# Patient Record
Sex: MALE | Race: WHITE | Employment: OTHER | URBAN - METROPOLITAN AREA
[De-identification: names, ages, dates, MRNs, and addresses within clinical notes are randomized per-mention and may not be internally consistent; named-entity substitution may affect disease eponyms.]

---

## 2018-06-11 PROBLEM — E66.01 OBESITY, MORBID (HCC): Status: ACTIVE | Noted: 2018-06-11

## 2019-04-09 PROBLEM — I48.3 TYPICAL ATRIAL FLUTTER (HCC): Status: ACTIVE | Noted: 2019-04-09

## 2019-05-12 ENCOUNTER — HOSPITAL ENCOUNTER (OUTPATIENT)
Dept: SLEEP MEDICINE | Age: 69
Discharge: HOME | End: 2019-05-12
Attending: INTERNAL MEDICINE

## 2019-05-14 NOTE — PROGRESS NOTES
Patient pre-assessment complete for BEBETO & Atrial flutter ablation with Dr Niko Thompson scheduled for 19 at 10:30am, arrival time 8:30am. Patient verified using . Patient instructed to bring all home medications in labeled bottles on the day of procedure. NPO status reinforced. Patient instructed to HOLD losartan on Wednesday night & HOLD eliquis the am of procedure. Instructed they can take all other medications excluding vitamins & supplements. Patient verbalizes understanding of all instructions & denies any questions at this time.

## 2019-05-15 ENCOUNTER — ANESTHESIA EVENT (OUTPATIENT)
Dept: SURGERY | Age: 69
End: 2019-05-15
Payer: MEDICARE

## 2019-05-15 RX ORDER — SODIUM CHLORIDE, SODIUM LACTATE, POTASSIUM CHLORIDE, CALCIUM CHLORIDE 600; 310; 30; 20 MG/100ML; MG/100ML; MG/100ML; MG/100ML
75 INJECTION, SOLUTION INTRAVENOUS CONTINUOUS
Status: CANCELLED | OUTPATIENT
Start: 2019-05-15

## 2019-05-15 RX ORDER — FLUMAZENIL 0.1 MG/ML
0.2 INJECTION INTRAVENOUS
Status: CANCELLED | OUTPATIENT
Start: 2019-05-15

## 2019-05-15 RX ORDER — NALOXONE HYDROCHLORIDE 0.4 MG/ML
0.1 INJECTION, SOLUTION INTRAMUSCULAR; INTRAVENOUS; SUBCUTANEOUS
Status: CANCELLED | OUTPATIENT
Start: 2019-05-15

## 2019-05-15 RX ORDER — MIDAZOLAM HYDROCHLORIDE 1 MG/ML
2 INJECTION, SOLUTION INTRAMUSCULAR; INTRAVENOUS
Status: CANCELLED | OUTPATIENT
Start: 2019-05-15 | End: 2019-05-16

## 2019-05-15 RX ORDER — SODIUM CHLORIDE, SODIUM LACTATE, POTASSIUM CHLORIDE, CALCIUM CHLORIDE 600; 310; 30; 20 MG/100ML; MG/100ML; MG/100ML; MG/100ML
100 INJECTION, SOLUTION INTRAVENOUS CONTINUOUS
Status: CANCELLED | OUTPATIENT
Start: 2019-05-15 | End: 2019-05-16

## 2019-05-15 RX ORDER — HYDROMORPHONE HYDROCHLORIDE 2 MG/ML
0.5 INJECTION, SOLUTION INTRAMUSCULAR; INTRAVENOUS; SUBCUTANEOUS
Status: CANCELLED | OUTPATIENT
Start: 2019-05-15

## 2019-05-15 RX ORDER — OXYCODONE HYDROCHLORIDE 5 MG/1
5 TABLET ORAL
Status: CANCELLED | OUTPATIENT
Start: 2019-05-15 | End: 2019-05-16

## 2019-05-15 RX ORDER — DIPHENHYDRAMINE HYDROCHLORIDE 50 MG/ML
12.5 INJECTION, SOLUTION INTRAMUSCULAR; INTRAVENOUS
Status: CANCELLED | OUTPATIENT
Start: 2019-05-15

## 2019-05-15 RX ORDER — LIDOCAINE HYDROCHLORIDE 10 MG/ML
0.1 INJECTION INFILTRATION; PERINEURAL AS NEEDED
Status: CANCELLED | OUTPATIENT
Start: 2019-05-15

## 2019-05-16 ENCOUNTER — ANESTHESIA (OUTPATIENT)
Dept: SURGERY | Age: 69
End: 2019-05-16
Payer: MEDICARE

## 2019-05-16 ENCOUNTER — APPOINTMENT (OUTPATIENT)
Dept: CARDIAC CATH/INVASIVE PROCEDURES | Age: 69
End: 2019-05-16
Attending: INTERNAL MEDICINE
Payer: MEDICARE

## 2019-05-16 ENCOUNTER — HOSPITAL ENCOUNTER (OUTPATIENT)
Age: 69
Setting detail: OUTPATIENT SURGERY
Discharge: HOME | End: 2019-05-16
Attending: INTERNAL MEDICINE | Admitting: INTERNAL MEDICINE
Payer: MEDICARE

## 2019-05-16 ENCOUNTER — APPOINTMENT (OUTPATIENT)
Dept: GENERAL RADIOLOGY | Age: 69
End: 2019-05-16
Attending: INTERNAL MEDICINE
Payer: MEDICARE

## 2019-05-16 VITALS
TEMPERATURE: 98.1 F | DIASTOLIC BLOOD PRESSURE: 63 MMHG | SYSTOLIC BLOOD PRESSURE: 148 MMHG | BODY MASS INDEX: 42.95 KG/M2 | WEIGHT: 300 LBS | HEART RATE: 82 BPM | OXYGEN SATURATION: 98 % | RESPIRATION RATE: 15 BRPM | HEIGHT: 70 IN

## 2019-05-16 LAB
ANION GAP SERPL CALC-SCNC: 5 MMOL/L (ref 7–16)
ATRIAL RATE: 266 BPM
ATRIAL RATE: 90 BPM
BUN SERPL-MCNC: 14 MG/DL (ref 8–23)
CALCIUM SERPL-MCNC: 9.1 MG/DL (ref 8.3–10.4)
CALCULATED P AXIS, ECG09: -104 DEGREES
CALCULATED P AXIS, ECG09: 81 DEGREES
CALCULATED R AXIS, ECG10: -36 DEGREES
CALCULATED R AXIS, ECG10: -62 DEGREES
CALCULATED T AXIS, ECG11: 32 DEGREES
CALCULATED T AXIS, ECG11: 63 DEGREES
CHLORIDE SERPL-SCNC: 110 MMOL/L (ref 98–107)
CO2 SERPL-SCNC: 28 MMOL/L (ref 21–32)
CREAT SERPL-MCNC: 0.97 MG/DL (ref 0.8–1.5)
DIAGNOSIS, 93000: NORMAL
DIAGNOSIS, 93000: NORMAL
ERYTHROCYTE [DISTWIDTH] IN BLOOD BY AUTOMATED COUNT: 12.4 % (ref 11.9–14.6)
GLUCOSE SERPL-MCNC: 109 MG/DL (ref 65–100)
HCT VFR BLD AUTO: 43.1 % (ref 41.1–50.3)
HGB BLD-MCNC: 14 G/DL (ref 13.6–17.2)
INR PPP: 1
MAGNESIUM SERPL-MCNC: 2.2 MG/DL (ref 1.8–2.4)
MCH RBC QN AUTO: 29.9 PG (ref 26.1–32.9)
MCHC RBC AUTO-ENTMCNC: 32.5 G/DL (ref 31.4–35)
MCV RBC AUTO: 91.9 FL (ref 79.6–97.8)
NRBC # BLD: 0 K/UL (ref 0–0.2)
P-R INTERVAL, ECG05: 188 MS
PLATELET # BLD AUTO: 193 K/UL (ref 150–450)
PMV BLD AUTO: 9.9 FL (ref 9.4–12.3)
POTASSIUM SERPL-SCNC: 4 MMOL/L (ref 3.5–5.1)
PROTHROMBIN TIME: 13 SEC (ref 11.7–14.5)
Q-T INTERVAL, ECG07: 300 MS
Q-T INTERVAL, ECG07: 366 MS
QRS DURATION, ECG06: 76 MS
QRS DURATION, ECG06: 78 MS
QTC CALCULATION (BEZET), ECG08: 446 MS
QTC CALCULATION (BEZET), ECG08: 447 MS
RBC # BLD AUTO: 4.69 M/UL (ref 4.23–5.6)
SODIUM SERPL-SCNC: 143 MMOL/L (ref 136–145)
VENTRICULAR RATE, ECG03: 133 BPM
VENTRICULAR RATE, ECG03: 90 BPM
WBC # BLD AUTO: 6.8 K/UL (ref 4.3–11.1)

## 2019-05-16 PROCEDURE — 93653 COMPRE EP EVAL TX SVT: CPT

## 2019-05-16 PROCEDURE — 74011250636 HC RX REV CODE- 250/636

## 2019-05-16 PROCEDURE — C1894 INTRO/SHEATH, NON-LASER: HCPCS

## 2019-05-16 PROCEDURE — 77030013687 HC GD NDL BARD -B

## 2019-05-16 PROCEDURE — C1732 CATH, EP, DIAG/ABL, 3D/VECT: HCPCS

## 2019-05-16 PROCEDURE — 93613 INTRACARDIAC EPHYS 3D MAPG: CPT

## 2019-05-16 PROCEDURE — 85610 PROTHROMBIN TIME: CPT

## 2019-05-16 PROCEDURE — 74011250636 HC RX REV CODE- 250/636: Performed by: INTERNAL MEDICINE

## 2019-05-16 PROCEDURE — 77030035291 HC TBNG PMP SMARTABLATE J&J -B

## 2019-05-16 PROCEDURE — 77030027107 HC PTCH EXT REF CARTO3 J&J -F

## 2019-05-16 PROCEDURE — 85027 COMPLETE CBC AUTOMATED: CPT

## 2019-05-16 PROCEDURE — 93005 ELECTROCARDIOGRAM TRACING: CPT | Performed by: INTERNAL MEDICINE

## 2019-05-16 PROCEDURE — 76060000034 HC ANESTHESIA 1.5 TO 2 HR: Performed by: INTERNAL MEDICINE

## 2019-05-16 PROCEDURE — 83735 ASSAY OF MAGNESIUM: CPT

## 2019-05-16 PROCEDURE — 71045 X-RAY EXAM CHEST 1 VIEW: CPT

## 2019-05-16 PROCEDURE — 93312 ECHO TRANSESOPHAGEAL: CPT

## 2019-05-16 PROCEDURE — 93621 COMP EP EVL L PAC&REC C SINS: CPT

## 2019-05-16 PROCEDURE — 80048 BASIC METABOLIC PNL TOTAL CA: CPT

## 2019-05-16 PROCEDURE — C1893 INTRO/SHEATH, FIXED,NON-PEEL: HCPCS

## 2019-05-16 RX ORDER — HYDROCODONE BITARTRATE AND ACETAMINOPHEN 5; 325 MG/1; MG/1
1 TABLET ORAL
Status: DISCONTINUED | OUTPATIENT
Start: 2019-05-16 | End: 2019-05-16 | Stop reason: HOSPADM

## 2019-05-16 RX ORDER — SODIUM CHLORIDE 0.9 % (FLUSH) 0.9 %
5-40 SYRINGE (ML) INJECTION AS NEEDED
Status: DISCONTINUED | OUTPATIENT
Start: 2019-05-16 | End: 2019-05-16 | Stop reason: HOSPADM

## 2019-05-16 RX ORDER — SODIUM CHLORIDE, SODIUM LACTATE, POTASSIUM CHLORIDE, CALCIUM CHLORIDE 600; 310; 30; 20 MG/100ML; MG/100ML; MG/100ML; MG/100ML
INJECTION, SOLUTION INTRAVENOUS
Status: DISCONTINUED | OUTPATIENT
Start: 2019-05-16 | End: 2019-05-16 | Stop reason: HOSPADM

## 2019-05-16 RX ORDER — PROPOFOL 10 MG/ML
INJECTION, EMULSION INTRAVENOUS
Status: DISCONTINUED | OUTPATIENT
Start: 2019-05-16 | End: 2019-05-16 | Stop reason: HOSPADM

## 2019-05-16 RX ORDER — PROPOFOL 10 MG/ML
INJECTION, EMULSION INTRAVENOUS AS NEEDED
Status: DISCONTINUED | OUTPATIENT
Start: 2019-05-16 | End: 2019-05-16 | Stop reason: HOSPADM

## 2019-05-16 RX ORDER — ONDANSETRON 2 MG/ML
INJECTION INTRAMUSCULAR; INTRAVENOUS AS NEEDED
Status: DISCONTINUED | OUTPATIENT
Start: 2019-05-16 | End: 2019-05-16 | Stop reason: HOSPADM

## 2019-05-16 RX ORDER — ACETAMINOPHEN 325 MG/1
650 TABLET ORAL
Status: DISCONTINUED | OUTPATIENT
Start: 2019-05-16 | End: 2019-05-16 | Stop reason: HOSPADM

## 2019-05-16 RX ORDER — HYDROMORPHONE HYDROCHLORIDE 1 MG/ML
1 INJECTION, SOLUTION INTRAMUSCULAR; INTRAVENOUS; SUBCUTANEOUS
Status: DISCONTINUED | OUTPATIENT
Start: 2019-05-16 | End: 2019-05-16 | Stop reason: HOSPADM

## 2019-05-16 RX ORDER — SODIUM CHLORIDE 0.9 % (FLUSH) 0.9 %
5 SYRINGE (ML) INJECTION AS NEEDED
Status: DISCONTINUED | OUTPATIENT
Start: 2019-05-16 | End: 2019-05-16 | Stop reason: HOSPADM

## 2019-05-16 RX ORDER — HEPARIN SODIUM 200 [USP'U]/100ML
20-40 INJECTION, SOLUTION INTRAVENOUS CONTINUOUS
Status: DISCONTINUED | OUTPATIENT
Start: 2019-05-16 | End: 2019-05-16 | Stop reason: HOSPADM

## 2019-05-16 RX ORDER — SODIUM CHLORIDE 0.9 % (FLUSH) 0.9 %
5-40 SYRINGE (ML) INJECTION EVERY 8 HOURS
Status: DISCONTINUED | OUTPATIENT
Start: 2019-05-16 | End: 2019-05-16 | Stop reason: HOSPADM

## 2019-05-16 RX ORDER — MIDAZOLAM HYDROCHLORIDE 1 MG/ML
INJECTION, SOLUTION INTRAMUSCULAR; INTRAVENOUS AS NEEDED
Status: DISCONTINUED | OUTPATIENT
Start: 2019-05-16 | End: 2019-05-16 | Stop reason: HOSPADM

## 2019-05-16 RX ADMIN — HEPARIN SODIUM 20 UNITS/HR: 200 INJECTION, SOLUTION INTRAVENOUS at 12:31

## 2019-05-16 RX ADMIN — SODIUM CHLORIDE, SODIUM LACTATE, POTASSIUM CHLORIDE, CALCIUM CHLORIDE: 600; 310; 30; 20 INJECTION, SOLUTION INTRAVENOUS at 11:32

## 2019-05-16 RX ADMIN — MIDAZOLAM HYDROCHLORIDE 1 MG: 1 INJECTION, SOLUTION INTRAMUSCULAR; INTRAVENOUS at 11:45

## 2019-05-16 RX ADMIN — HEPARIN SODIUM 3000 UNITS: 1000 INJECTION, SOLUTION INTRAVENOUS; SUBCUTANEOUS at 12:16

## 2019-05-16 RX ADMIN — HEPARIN SODIUM 20 UNITS/HR: 200 INJECTION, SOLUTION INTRAVENOUS at 12:32

## 2019-05-16 RX ADMIN — PROPOFOL 20 MG: 10 INJECTION, EMULSION INTRAVENOUS at 11:47

## 2019-05-16 RX ADMIN — PROPOFOL 160 MCG/KG/MIN: 10 INJECTION, EMULSION INTRAVENOUS at 11:42

## 2019-05-16 RX ADMIN — MIDAZOLAM HYDROCHLORIDE 1 MG: 1 INJECTION, SOLUTION INTRAMUSCULAR; INTRAVENOUS at 11:42

## 2019-05-16 NOTE — PROCEDURES
Attending: Shraddha Graham. Willem Tiwari MD    Referring: Rony Orlando DO and Azeem Bethea MD      Pre-Electrophysiology Diagnosis  1. Typical atrial flutter.     Procedure Performed  1. Electrophysiology testing with right-sided atrial flutter ablation. 2. Left atrial pacing recording from the coronary sinus. 3. 3-D Electroanatomical mapping  4. Transesophageal echo    Anesthesia: MAC     Estimated Blood Loss: Less than 10 mL     Specimens: * No specimens in log *    Fluoroscopy Time: 0.6 minutes/20 mGy. Complications: None      Procedure in Detail:  The patient was brought to the electrophysiology lab in the fasting state. A Ref-Star CARTO patch was placed, the patient was then prepped and draped in sterile fashion. A transesophageal echocardiogram was performed prior to the procedure and was negative for an CATRINA thrombus (see full report in the chart). Venous access was then obtained x2 using modified Seldinger technique under ultrasound guidance, with placement of 2 short sidearm sheaths into the right femoral vein. One of the sheaths was upsized to an 8.5 F SL-1 sheath. A 3.5 mm [employer] porous irrigated [employer] ablation catheter was inserted into the SL-1 sheath and was used to create a 3D electroanatomical map of the right atrium focusing on the cavotricuspid isthmus and into the coronary sinus. The ablation catheter was used to record intracardiac electrograms along the lateral wall of the right atrium and the His electrogram.  A multipolar catheter was then inserted via an 8Fr sheath and positioned in the mid coronary sinus. The patient presented to the EP lab in the clinical arrhythmia with pre-procedural concern for a right atrial flutter. After right atrial and coronary sinus electrograms were obtained, it was clear the majority of the cycle length was accounted for with a counterclockwise activation pattern consistent with CTI dependent atrial flutter.   Entrainment was performed with proximal CS pacing and lateral TV pacing revealing concealed entrainment with a PPI-TCL of less than 20 msec. RF ablation was performed during the clinical tachycardia. Linear ablation across the cavo-tricuspid isthmus was performed starting with 1:2 A:V EGMs along the isthmus at 6pm ILEANA. During delivery of RF, the arrhythmia terminated and further ablation was performed to obtain bidirectional block. The local electrogram activation sequence, differential pacing maneuvers and electrogram timing was used to demonstrate bidirectional block along the cavotricuspid isthmus with further ablation. Tachycardia cycle length: 240 msec  Local double potential atrial electrograms: 90 msec  Trans-isthmus time post ablation: 152 msec    The coronary sinus multipolar catheter was used to pace the left atrium during the EP study. The LA CS electrograms were documented and interpreted during the procedure. A comprehensive EP study was performed with 1:1 AV decremental pacing, atrial extrastimuli and ventricular pacing to assess retrograde conduction. The patient did not have sustained slow pathway conduction or evidence of an accessory pathway. Ventricular pacing revealed retrograde VA conduction which was concentric and decremental.    Baseline Intervals    QRS duration: 73 msec  MA interval: 181 msec  RR interval: 478 msec  AH interval: 107 msec  HV interval: 52 msec    EP Study    AV Wenchebach: 300 msec  VA Wenchebach: <500 msec    Figure 1: 3D electroanatomic map of the right atrium. Ablation tags consistent with successful line of ablation across the cavo-tricuspid isthmus. At the completion of the final comprehensive EP study, all catheters were removed, and sheaths pulled. The patient tolerated the procedure well with no acute complications recognized. Plan of care:  The patient will be placed in observation on telemetry, 4 hour flat time, followed by ambulation as tolerated and will continue anticoagulation as prescribed pre-procedure. Summary:   1. Successful ablation of clockwise RA flutter. 2. Creation of a line of bidirectional block at the cavotricuspid isthmus. 3.         Comprehensive EP study . 4. Pt tolerated the procedure well. 5. Family updated. Plan:  -Discharge after successful 4 hour bedrest.   -Continue current medicines. -EP follow up in one month. Alyssa Born.  Pham Ivy MD, [facility] [address]  Clinical Cardiac Electrophysiology  [facility] Cardiology  5/16/2019  1:26 PM

## 2019-05-16 NOTE — PROGRESS NOTES
Assisted to ambulate in hallway. Tolerated well. Right groin site remained soft with no bleeding to dsg noted. Discharge instructions given. All questions answered.

## 2019-05-16 NOTE — H&P
The patient has been examined and the previous clinic note dated, 2019, has been reviewed and changes have been noted below. 70year old male with a history of typical appearing atrial flutter here for AF ablation with pre-procedural BEBETO. He has undergone informed consent and will proceed with planned procedure under MAC. Erum Eldridge. Kalani Tao MD, MS Clinical Cardiac Electrophysiology [facility] Cardiology NAME:  Christopher Pelayo : 1950 MRN: [id number]  
  
Referring: Yunior Pina DO and Sharda Ruth MD 
  
Reason for Consultation: Atrial flutter  
  
ASSESSMENT and PLAN: 
Diagnoses and all orders for this visit: 
  
1. Typical atrial flutter ([address] [id number] = 3+ 
  
2. Essential hypertension 
  
3. Hypothyroidism due to acquired atrophy of thyroid 
  
4. Pre-diabetes 
  
5. Mixed hyperlipidemia 
  
6. Obesity  
  
7. Snoring 
  
8. Daytime sleepiness  
  
70-year-old male with a recent diagnosis of typical appearing atrial flutter by his primary care physician. The patient has some mild symptoms of fatigue but otherwise denies anything drastic such as shortness of breath or chest pain. We discussed that atrial flutter can be very difficult to control with medicines alone and it does increase his stroke risk. We also discussed that it has a high probability of affecting his heart function by causing a tachycardia induced cardiomyopathy. We discussed the treatment options which include doing nothing further versus consideration of a cardioversion with an antiarrhythmic drug versus a catheter ablation procedure. We discussed that he would have to be on medicines for the long-term but if he chose to do the ablation we could potentially get him off and only his beta-blocker but his blood thinner within several months of doing the procedure and offer a cure for his atrial flutter potentially.   He was motivated to undergo the ablation procedure and we will set him up in the near future to perform this along with a preprocedure BEBETO. 
  
He also is obese and has a large neck and snores at night. He does exhibit symptoms of daytime sleepiness. We will refer him for sleep study and have them further referred for treatment for his sleep apnea if he is diagnosed with this as it can reduce his potential of having further atrial arrhythmias including atrial fibrillation in the future. 
  
-Plan for AFL ablation. -BEBETO 
-Echo 
-Referral for sleep study. 
-Continue current therapyies. -Lifestyle modification/weight loss  
  
     I  explained  to  the  patient  in  detail  today  the  pathophysiology  of  and  managementoptions  for  atrial  flutter,  including  rate  control  versus  rhythm  control  with  antiarrhythmic  drugs  (AAD)  or  catheter  ablation. I  also  explained  to  the  patient  that  approximately  50%  of  patients  with  atrial  flutter  may  have  or  develop  atrial  fibrillation  some  time  in  the  future. This  patient  has  not  had  documented  atrial  fibrillation  and  a  typical  atrial  flutter  ablation  is  not  technically  complex  and  relatively  low  risk  (<<1%  for  major complications). I  also  explained  that  atrial  flutter  is  often  more  difficult  to  rate  control  than  atrial  fibrillation,  and  although  this  will  not  address  AF  if  it  recurs  (nor  left  atrial  flutters),  a  typical  (right)  atrial  flutter  ablation  can  almost  always  eliminate  episodes  of  persistent  AFL  with  RVR. 
  
    This  may  provide  for  improved  rate  control  and  symptomatic  relief  in  the  future. Then,  if  AF  recurs  and  is  symptomatic,  we  can  discuss  further  rhythm  control  strategies  at  that  time.     The  catheter  ablation  procedure  was  described  to the  patient  in  detail,  including  the  risks  of  recurrent  AF,  stroke,  cardiac perforation  with  the  need  for  catheter  drainage  or  surgical  repair,  vascular  damage,  DVT/PE,  bleeding,  pneumo/hemothorax,  need  for  permanent  pacemaker,  phrenic  or  vagus  nerve  damage  resulting  in  diaphragmatic  paralysis  or  gastroparesis,  thermal  skin  burns,  and  even  death. The  patient  understands  these  risks  and  wishes  to  proceed  with  ablation. We  will  perform  the  ablation  procedure  on  oral  anticoagulation  therapy  ([facility])  to  reduce  the  risk  of  developing  LA  thrombus. The  patient  understands  that  there  will  be  a  slightly  higher  bleeding  risk  and  agrees  to  proceed  as  planned. The  patient  will  need  to  continue  anticoagulation  for  at  least  one  month  post  procedure,  and  should  continue  until  we  can  satisfactorily  document  the  lack  of  recurrent  atrial  arrhythmias.   
  
I spent  a  total  of  45  minutes  with  the  patient,  with  over  half  of  the  time  spent  discussing  pathophysiolgy  and  treatment/management options. 
  
Patient has been instructed and agrees to call our office with any issues or other concerns related to their cardiac condition(s) and/or complaint(s). Thank you for allowing me to participate in the electrophysiologic care of Mr. Marlee Partida. Please contact me if any questions or concerns were to arise. 
  
Jace Randle MD, MS Clinical Cardiac Electrophysiology [facility] Cardiology 04/12/19 
9:13 AM 
  
=================================================================== Chief Complant:   
Chief Complaint Patient presents with  Referral / Consult  
    per Dr. Lucy Raygoza for afib   
  
  
Consultation is requested by Nahomy Russ for evaluation of Referral / Consult (per Dr. Lucy Raygoza for afib )   
  
History: 
Marlee Partida is a most pleasant 70 y.o. male with a past medical and cardiac history significant for recently he is lived in the [place] area for several years and is  to his wife. Atrial flutter by his primary care doctor, Dr. Aminata Gan. He was referred to Dr. Sloan Vee earlier this week for atrial flutter. At that time he was started on Eliquis and metoprolol. Given his minimal symptoms he was set up for referral to electrophysiology. The patient describes having no significant symptoms such as shortness of breath or chest pain. He does exhibit some mild fatigue. He did not realize he was out of rhythm and atrial flutter. He is obese with a BMI of 40. He does have a large neck and he has a history of snoring as well. Denies a significant past cardiac history. He further denies a history of diabetes type 2 or arterial disease. He has never undergone a left heart catheterization and is not have a recent stress test in our system. He works as a  and was born in [place], [place] 
  
Cardiac PMH: (Old records have been reviewed and summarized below) 
  
EKG:  (EKG has been independently visualized by me with interpretation below): Atrial flutter with rapid ventricular response.  
  
ECHO: n/a  
  
Previous Heart Catheterization: n/a  
  
Stress Test: n/a  
  
Past Medical History, Past Surgical History, Family history, Social History, and Medications were all reviewed with the patient today and updated as necessary.  
  
      
Current Outpatient Medications Medication Sig Dispense Refill  levothyroxine (SYNTHROID) 200 mcg tablet Take 1 Tab by mouth Daily (before breakfast). 90 Tab 3  
 losartan (COZAAR) 50 mg tablet Take 1 Tab by mouth daily. (Patient taking differently: Take 25 mg by mouth daily.) 90 Tab 3  pravastatin (PRAVACHOL) 20 mg tablet Take 1 Tab by mouth nightly. 90 Tab 3  
 allopurinol (ZYLOPRIM) 300 mg tablet TAKE 1 TABLET BY MOUTH EVERY DAY 30 Tab 0  
 metoprolol succinate (TOPROL-XL) 25 mg XL tablet Take 1 Tab by mouth daily.  30 Tab 11  
  apixaban (ELIQUIS) 5 mg tablet Take 1 Tab by mouth two (2) times a day. 60 Tab 6  
  
     
Allergies Allergen Reactions  Other Plant, Animal, Environmental Other (comments)  
    Wasp. Dizziness; syncopal  
 Bee Sting [Sting, Bee] Anaphylaxis  
  
    
Patient Active Problem List  
  Diagnosis  Typical atrial flutter ([address]  Obesity, morbid ([address]  Mixed hyperlipidemia  Idiopathic gout  Pre-diabetes  Essential hypertension  Hypothyroidism due to acquired atrophy of thyroid  Overweight(278.02)  Elevated PSA  
    The patient presented with a PSA of 6.21 in July 2012. Prostate biopsy was negative in October 2012. Prostate volume was estimated at 30 cubic centimeters. 
  
   
 Hematuria  
    Pre op 
  
   
 Osteoarthritis of hip  JOSUÉ (total hip arthroplasty)  Osteoarthritis of hip  JOSUÉ (total hip arthroplasty)  
  
  
    
Past Medical History:  
Diagnosis Date  Arrhythmia 2006  
  LAST ECHO 2006 ? [facility]  Elevated PSA    
  Dr Eboni Berry  Gout    
  medication  Hematuria 8/4/2011  
  Pre op  History of hepatitis B 1980  Hypertension    
  controlled with medication [address]  Mixed hyperlipidemia    
  ascvd risk 20.5 in 2016  Morbid obesity ([address]    
 Osteoarthritis of hip 4/6/2011  
  LEFT TKA  Pre-diabetes    
 JOSUÉ (total hip arthroplasty) 8/3/2011  Thyroid disease    
  hypothyroidism - medication  
  
     
Past Surgical History:  
Procedure Laterality Date  HX HEENT      
  lens implants  HX ORTHOPAEDIC   1965  
  left middle finger repair bone chip [facility]  MA PROSTATE BIOPSY, NEEDLE, SATURATION SAMPLING      
 TOTAL HIP ARTHROPLASTY   4/2011  
  LEFT and right  
  
     
Family History Problem Relation Age of Onset  Lung Disease Mother    
      CHF  Lung Disease Father    
 Cancer Father    
      liver cancer  Other Sister    
 Malignant Hyperthermia Neg Hx    
  Delayed Awakening Neg Hx    
 Post-op Nausea/Vomiting Neg Hx    
 Emergence Delirium Neg Hx    
 Post-op Cognitive Dysfunction Neg Hx    
 Pseudocholinesterase Deficiency Neg Hx    
  
Social History  
  
     
Tobacco Use  Smoking status: Never Smoker  Smokeless tobacco: Never Used Substance Use Topics  Alcohol use: No  
    Comment: few times a year  
  
  
ROS:  A comprehensive review of systems was performed with the pertinent positives and negatives as noted in the HPI in addition to: 
  
Review of Systems Constitutional: Negative. HENT: Negative. Eyes: Negative. Respiratory: Negative. Cardiovascular: Negative. Gastrointestinal: Negative. Genitourinary: Negative. Musculoskeletal: Negative. Skin: Negative. Neurological: Negative. Endo/Heme/Allergies: Negative. Psychiatric/Behavioral: Negative.   
  
PHYSICAL EXAM: 
  
Visit Vitals /90 Ht 5' 11\" (1.803 m) Wt 315 lb (142.9 kg) BMI 43.93 kg/m²  
  
  
   
Wt Readings from Last 3 Encounters:  
04/12/19 315 lb (142.9 kg) 04/09/19 318 lb (144.2 kg) 04/09/19 318 lb 3.2 oz (144.3 kg)  
  
   
BP Readings from Last 3 Encounters:  
04/12/19 140/90  
04/09/19 148/90  
04/09/19 140/72  
  
  
Gen: Well appearing, well developed, no acute distress, obese, large neck circumference. Eyes: Pupils equal, round. Extraocular movements are intact ENT: Oropharynx clear, no oral lesions, normal dentition CV: S1S2, IRRR, no murmurs, rubs or gallops, normal JVD, no carotid bruits, normal distal pulses, no RICARDO Pulm: Clear to auscultation bilaterally, no accessory muscle uses, no wheezes or rales GI: Soft, NT, ND, +BS Neuro: Alert and oriented, nonfocal 
Psych: Appropriate affect Skin: Normal color and skin turgor MSK: Normal muscle bulk and tone 
  
Medical problems and test results were reviewed with the patient today.  
  
No results found for any visits on 04/12/19. 
  
     
Lab Results Component Value Date/Time  
  Potassium 4.0 04/03/2019 09:53 AM  
  
     
Lab Results Component Value Date/Time  
  Creatinine 0.99 04/03/2019 09:53 AM  
  
     
Lab Results Component Value Date/Time  
  HGB 14.2 06/05/2018 08:57 AM  
  
     
Lab Results Component Value Date/Time  
  INR <0.9 (L) 07/27/2011 08:50 AM  
  INR 1.1 04/09/2011 05:14 AM  
  INR 1.0 04/08/2011 04:55 AM  
  Prothrombin time 9.4 07/27/2011 08:50 AM  
  Prothrombin time 11.4 04/09/2011 05:14 AM  
  Prothrombin time 10.6 04/08/2011 04:55 AM

## 2019-05-16 NOTE — ANESTHESIA PREPROCEDURE EVALUATION
Relevant Problems No relevant active problems Anesthetic History No history of anesthetic complications Review of Systems / Medical History Patient summary reviewed and pertinent labs reviewed Pulmonary Sleep apnea (Recently diagnosed. Has not started CPAP yet.) Neuro/Psych Within defined limits Cardiovascular Hypertension: well controlled Dysrhythmias : atrial flutter Exercise tolerance: >4 METS Comments: Denies recent CP, SOB or Palpitations GI/Hepatic/Renal 
Within defined limits Endo/Other Hypothyroidism: well controlled Morbid obesity Other Findings Physical Exam 
 
Airway Mallampati: III 
TM Distance: 4 - 6 cm Neck ROM: normal range of motion, short neck Mouth opening: Normal 
 
 Cardiovascular Rate: abnormal 
 
 
 
 Dental 
No notable dental hx Pulmonary Breath sounds clear to auscultation Abdominal 
GI exam deferred Other Findings Anesthetic Plan ASA: 3 Anesthesia type: total IV anesthesia Induction: Intravenous Anesthetic plan and risks discussed with: Patient

## 2019-05-16 NOTE — PROGRESS NOTES
Patient received to [address] [place] [address] room # 9  Ambulatory from [facility]. Patient scheduled for Aflutter today with Dr Dahlia Sage. Procedure reviewed & questions answered, voiced good understanding consent obtained & placed on chart. All medications and medical history reviewed. Will prep patient per orders. Patient & family updated on plan of care. The patient has a fraility score of 3-MANAGING WELL, based on ability to perform ADLS by self.

## 2019-05-16 NOTE — PROGRESS NOTES
Received from EP lab. Report from [place] [id number] for continued care. Drowsy on arrival. Instructed to keep right leg straight and to stay on back. Right groin remains soft with no bleeding noted. Dsg intact.

## 2019-05-16 NOTE — DISCHARGE INSTRUCTIONS
HEART CATHETERIZATION/ABLATION DISCHARGE INSTRUCTIONS    1. Check puncture site frequently for swelling or bleeding. If there is any bleeding, lie down and apply pressure over the area with a clean towel or washcloth. Notify your doctor for any redness, swelling, drainage, or oozing from the puncture site. Notify your doctor for any fever or chills. 2. If the extremity becomes cold, numb, or painful call [facility] Cardiology at [phone].  3. Activity should be limited for the next 48 hours. Climb stairs as little as possible and avoid any stooping, bending, or strenuous activity for 48 hours. No heavy lifting (anything over 10 pounds) for 3 days. 4. You may resume your usual diet. Drink more fluids than usual.  5. Have a responsible person drive you home and stay with you for at least 24 hours after your heart catheterization/angiography. 6. You may remove bandage from your Right groin in 24 hours. You may shower in 24 hours. No tub baths, hot tubs, or swimming for 1 week. Do not place any lotions, creams, powders, or ointments over puncture site for 1 week. You may place a clean band-aid over the puncture site each day for 5 days. Change daily. I have read the above instructions and have had the opportunity to ask questions.       Patient: ________________________   Date: 5/16/2019    Witness: _______________________   Date: 5/16/2019

## 2019-05-17 NOTE — ANESTHESIA POSTPROCEDURE EVALUATION
Procedure(s): 
BEBETO 
AFLUTTER ABLATION. total IV anesthesia Anesthesia Post Evaluation Patient location during evaluation: PACU Patient participation: complete - patient participated Level of consciousness: awake Pain management: adequate Airway patency: patent Anesthetic complications: no 
Cardiovascular status: acceptable Respiratory status: spontaneous ventilation and acceptable Hydration status: acceptable Post anesthesia nausea and vomiting:  none No vitals data found for the desired time range.

## 2019-05-20 RX ORDER — HEPARIN SODIUM 1000 [USP'U]/ML
INJECTION, SOLUTION INTRAVENOUS; SUBCUTANEOUS AS NEEDED
Status: DISCONTINUED | OUTPATIENT
Start: 2019-05-16 | End: 2019-05-20 | Stop reason: HOSPADM

## 2019-05-20 NOTE — ADDENDUM NOTE
Addendum  created 05/20/19 0363 by Robbie Lee CRNA Intraprocedure Meds edited, Orders acknowledged in Narrator

## 2019-05-24 PROBLEM — Z72.821 INADEQUATE SLEEP HYGIENE: Status: ACTIVE | Noted: 2019-05-24

## 2019-05-24 PROBLEM — G47.61 PLMD (PERIODIC LIMB MOVEMENT DISORDER): Status: ACTIVE | Noted: 2019-05-24

## 2019-05-24 PROBLEM — G47.34 NOCTURNAL HYPOXEMIA: Status: ACTIVE | Noted: 2019-05-24

## 2019-05-24 PROBLEM — G47.33 OSA (OBSTRUCTIVE SLEEP APNEA): Status: ACTIVE | Noted: 2019-05-24

## 2019-07-11 PROBLEM — R06.02 SHORTNESS OF BREATH: Status: ACTIVE | Noted: 2019-07-11

## 2019-08-07 PROBLEM — G47.30 SLEEP APNEA: Status: ACTIVE | Noted: 2019-05-14

## 2019-09-24 ENCOUNTER — HOSPITAL ENCOUNTER (OUTPATIENT)
Dept: GENERAL RADIOLOGY | Age: 69
Discharge: HOME | End: 2019-09-24
Attending: ORTHOPAEDIC SURGERY
Payer: MEDICARE

## 2019-09-24 VITALS
HEIGHT: 71 IN | OXYGEN SATURATION: 95 % | BODY MASS INDEX: 40.46 KG/M2 | DIASTOLIC BLOOD PRESSURE: 68 MMHG | HEART RATE: 90 BPM | SYSTOLIC BLOOD PRESSURE: 161 MMHG | WEIGHT: 289 LBS

## 2019-09-24 DIAGNOSIS — Z96.641 PRESENCE OF RIGHT ARTIFICIAL HIP JOINT: ICD-10-CM

## 2019-09-24 DIAGNOSIS — M25.551 PAIN IN RIGHT HIP: ICD-10-CM

## 2019-09-24 PROCEDURE — 74011000250 HC RX REV CODE- 250: Performed by: ORTHOPAEDIC SURGERY

## 2019-09-24 PROCEDURE — 20610 DRAIN/INJ JOINT/BURSA W/O US: CPT

## 2019-09-24 RX ORDER — LIDOCAINE HYDROCHLORIDE 20 MG/ML
10 INJECTION, SOLUTION INFILTRATION; PERINEURAL
Status: COMPLETED | OUTPATIENT
Start: 2019-09-24 | End: 2019-09-24

## 2019-09-24 RX ADMIN — LIDOCAINE HYDROCHLORIDE 10 ML: 20 INJECTION, SOLUTION INFILTRATION; PERINEURAL at 15:00

## 2019-09-30 ENCOUNTER — HOSPITAL ENCOUNTER (OUTPATIENT)
Dept: CT IMAGING | Age: 69
Discharge: HOME | End: 2019-09-30
Attending: OTOLARYNGOLOGY
Payer: MEDICARE

## 2019-09-30 DIAGNOSIS — J39.8: ICD-10-CM

## 2019-09-30 LAB — CREAT BLD-MCNC: 0.8 MG/DL (ref 0.8–1.5)

## 2019-09-30 PROCEDURE — 70491 CT SOFT TISSUE NECK W/DYE: CPT

## 2019-09-30 PROCEDURE — 74011000258 HC RX REV CODE- 258: Performed by: OTOLARYNGOLOGY

## 2019-09-30 PROCEDURE — 74011636320 HC RX REV CODE- 636/320: Performed by: OTOLARYNGOLOGY

## 2019-09-30 PROCEDURE — 82565 ASSAY OF CREATININE: CPT

## 2019-09-30 RX ORDER — SODIUM CHLORIDE 0.9 % (FLUSH) 0.9 %
10 SYRINGE (ML) INJECTION
Status: COMPLETED | OUTPATIENT
Start: 2019-09-30 | End: 2019-09-30

## 2019-09-30 RX ADMIN — SODIUM CHLORIDE 100 ML: 900 INJECTION, SOLUTION INTRAVENOUS at 07:51

## 2019-09-30 RX ADMIN — IOPAMIDOL 80 ML: 755 INJECTION, SOLUTION INTRAVENOUS at 07:50

## 2019-09-30 RX ADMIN — Medication 10 ML: at 07:50

## 2020-03-07 PROBLEM — M25.551 PAIN IN RIGHT HIP: Status: ACTIVE | Noted: 2020-03-07

## 2020-03-07 PROBLEM — Z96.641 PRESENCE OF RIGHT ARTIFICIAL HIP JOINT: Status: ACTIVE | Noted: 2020-03-07

## 2020-03-07 PROBLEM — Z96.642 PRESENCE OF LEFT ARTIFICIAL HIP JOINT: Status: ACTIVE | Noted: 2020-03-07

## 2020-03-07 PROBLEM — Z09 FOLLOW-UP EXAMINATION, FOLLOWING OTHER SURGERY: Status: ACTIVE | Noted: 2020-03-07

## 2020-03-29 PROBLEM — G47.30 SLEEP APNEA: Status: RESOLVED | Noted: 2019-05-14 | Resolved: 2020-03-29

## 2020-12-14 PROBLEM — R06.02 SHORTNESS OF BREATH: Status: RESOLVED | Noted: 2019-07-11 | Resolved: 2020-12-14

## 2020-12-14 PROBLEM — M25.551 PAIN IN RIGHT HIP: Status: RESOLVED | Noted: 2020-03-07 | Resolved: 2020-12-14

## 2020-12-14 PROBLEM — Z09 FOLLOW-UP EXAMINATION, FOLLOWING OTHER SURGERY: Status: RESOLVED | Noted: 2020-03-07 | Resolved: 2020-12-14

## 2022-03-18 PROBLEM — G47.61 PLMD (PERIODIC LIMB MOVEMENT DISORDER): Status: ACTIVE | Noted: 2019-05-24

## 2022-03-19 PROBLEM — I48.3 TYPICAL ATRIAL FLUTTER (HCC): Status: ACTIVE | Noted: 2019-04-09

## 2022-03-19 PROBLEM — G47.34 NOCTURNAL HYPOXEMIA: Status: ACTIVE | Noted: 2019-05-24

## 2022-03-19 PROBLEM — G47.33 OSA (OBSTRUCTIVE SLEEP APNEA): Status: ACTIVE | Noted: 2019-05-24

## 2022-04-25 PROBLEM — M79.18 GLUTEAL PAIN: Status: ACTIVE | Noted: 2022-04-25

## 2022-04-25 PROBLEM — R20.8 OTHER DISTURBANCES OF SKIN SENSATION: Status: ACTIVE | Noted: 2022-04-25

## 2022-05-26 ENCOUNTER — HOSPITAL ENCOUNTER (OUTPATIENT)
Dept: PHYSICAL THERAPY | Age: 72
Setting detail: RECURRING SERIES
Discharge: HOME | End: 2022-05-29
Payer: MEDICARE

## 2022-05-26 PROCEDURE — 97110 THERAPEUTIC EXERCISES: CPT

## 2022-05-26 PROCEDURE — 97162 PT EVAL MOD COMPLEX 30 MIN: CPT

## 2022-05-26 ASSESSMENT — PAIN SCALES - GENERAL: PAINLEVEL_OUTOF10: 1

## 2022-05-26 NOTE — PROGRESS NOTES
Zeeshan Soto  : 1950  Primary: Karin Carvajal Ppo  Secondary:  [address] [facility] Therapy [address] [address]  Phone: [phone]  Fax: [phone] Plan Frequency: 2x/wk for 12 weeks    Plan of Care/Certification Expiration Date: 22      PT Visit Info:    No data recorded    OUTPATIENT PHYSICAL THERAPY:OP NOTE TYPE: Treatment Note 2022       Episode   Appt Desk       Treatment Diagnosis:  Low Back Pain (M54.5)  Other intervertebral disc degeneration, lumbar region (M51.36)  Medical/Referring Diagnosis:  Other intervertebral disc degeneration, lumbar region [M51.36]  Referring Physician:  Sj Mcneil MD MD Orders:  PT Eval and Treat   Date of Onset:  Onset Date: 22     Allergies:  Patient has no allergy information on record. Restrictions/Precautions:    No data recordedNo data recorded   Interventions Planned (Treatment may consist of any combination of the following):    Current Treatment Recommendations: Strengthening; ROM; Manual Therapy - Soft Tissue Mobilization; Manual Therapy - Joint Manipulation; Pain management; Home exercise program; Patient/Caregiver education & training; Modalities; Aquatics     Subjective Comments:  Pt reports pain in his buttocks. Initial:     1/10 Post Session:     1/10  Medications Last Reviewed:  2022  Updated Objective Findings:  See evaluation note from today  Treatment   THERAPEUTIC EXERCISE: (10 minutes):    Exercises per grid below to improve mobility and strength. Required minimal verbal cues to promote proper body alignment and promote proper body mechanics. Progressed resistance and repetitions as indicated.      Date:  22 Date:   Date:     Activity/Exercise Parameters Parameters Parameters   SKTC 3 reps  15 sec holds  (HEP)     Pelvic Tilts 10 reps  5 sec holds  (HEP)     Theraband Clam Shells in Mc[a]list gamesson Green T-band  15 reps Treatment/Session Summary:    · Treatment Assessment:   Pt tolerated treatments well today. I encouraged him to carry wallet in his front pocket and to take breaks from sitting at work. · Communication/Consultation:  None today  · Equipment provided today:  None  · Recommendations/Intent for next treatment session: Next visit will focus on progression of therex as tolerable, manual therapy.     Total Treatment Billable Duration:  10 minutes  Time In: 0870  Time Out: 45 Kate Kovacs MD Guidelines  Scanned Media  Benefits  MyChart

## 2022-05-26 NOTE — PLAN OF CARE
Claudia Lyons  : 1950  Primary: Rey Marinelli Ppo  Secondary:  [facility] Therapy [address] [address]  Phone: [phone]  Fax: [phone] Plan Frequency: 2x/wk for 12 weeks    Plan of Care/Certification Expiration Date: 22      PT Visit Info:    No data recorded    OUTPATIENT PHYSICAL THERAPY:OP NOTE TYPE: Initial Assessment 2022               Episode  Appt Desk         Treatment Diagnosis:  Low Back Pain (M54.5)  Other intervertebral disc degeneration, lumbar region (M51.36)  * No diagnoses found *  Medical/Referring Diagnosis:  Other intervertebral disc degeneration, lumbar region [M51.36]  Referring Physician:  Edgar La MD MD Orders:  PT Eval and Treat   Return MD Appt:  2022  Date of Onset:  Onset Date: 22     Allergies:  Patient has no allergy information on record. Restrictions/Precautions:    No data recordedNo data recorded   Medications Last Reviewed:  2022     SUBJECTIVE   History of Injury/Illness (Reason for Referral):  Pt states 9 weeks ago he was sitting at work and when he stood up he had pain in his R buttocks. He states when he walked the pain gradually subsided. He states he saw MD recently and she wanted him to have an MRI but insurance is wanting him to go through 6 weeks of PT first.  He started using a rolling walker May 10 due to difficulties walking due to the R buttock pain. He had x-rays which revealed lumbar disc degeneration. He carries his wallet in his R back pocket. Pt rates his current R buttock pain 1/10 sitting at rest, increasing to 6/10 at worst over the past week. Pt works a seated job-sits all day at work and takes minimal breaks. Pt denies any radiating pain or numbness/tingling into his LE's. Pt is not taking any medications for his R buttock pain.   Pt states he started drinking barrel whiskey around the time that his pain started which he thinks may have contributed to his current symptoms. Patient Stated Goal(s): To get rid of pain  Initial:     1/10 Post Session:     1/10  Past Medical History/Comorbidities:   Mr. Rashad Sweet  has a past medical history of Arrhythmia, Elevated PSA, Gout, Hematuria, History of hepatitis A, Hypertension, Malaria, Mixed hyperlipidemia, Morbid obesity ([address] Osteoarthritis of hip, Pre-diabetes, Sleep apnea, and Thyroid disease. Mr. Rashad Sweet  has a past surgical history that includes Tonsillectomy (1956); prostate biopsy, needle, saturation sampling; heent; Colonoscopy (2012); orthopedic surgery (1965); ablation of dysrhythmic focus (2019); and total hip arthroplasty (4/2011). Social His tory/Living Environment:   Lives With: Spouse  Type of Home: House     Prior Level of Function/Work/Activity:   Prior level of function: Independent  Current level of function: Pt is currently having difficulties with household ADL's due to his buttock pain  No data recordedNo data recorded   Learning:   Does the patient/guardian have any barriers to learning?: No barriers  Will there be a co-learner?: No  What is the preferred language of the patient/guardian?: English  Is an  required?: No  How does the patient/guardian prefer to learn new concepts?: Demonstration; Pictures/Videos     Fall Risk Scale: Total Score: 15  Tsai Fall Risk: Low (0-24)     Dominant Side:  right handed    Personal Factors:        Sex:  male        Age:  66 y.o. Profession:  Pt works a desk job        OBJECTIVE   Observations:      Forward head, rounded shoulders    Ambulation/WC:     Pt walks with a rolling walker    Functional Mobility:    Pt able to transition sit to supine and supine to sit independently     Dermatomes:   Sensation intact to light touch B LE's    Myotomes:  Left LE Myotomes (Lower Quarter)  L4-5: Ankle Dorsiflexion: Weak  Right LE Myotomes (Lower Quarter)  L4-5: Ankle Dorsiflexion: Weak    Reflexes:   DTR's 1+ to bilateral patellar and achilles    Lumbar:   Palpation:  Tenderness bilateral piriformis and gluteals    ROM:  Lumbar Flexion = 30 degrees (increased spasms in bilateral buttocks)  Lumbar Extension = 10 degrees  Lumbar Side Bending R = 18 degrees  Lumbar Side Bending L = 18 degrees    Strength:  R hip flexion = 4+/5  R hip abduction = 4+/5  R hip adduction = 5/5  R knee extension = 5/5  R knee flexion = 5/5  R ankle dorsiflexion = 4-/5  R ankle plantarflexion = 5/5    L hip flexion = 4+/5  L hip abduction = 4+/5  L hip adduction = 5/5  L knee extension = 5/5  L knee flexion = 5/5  L ankle dorsiflexion = 4-/5  L ankle plantarflexion = 5/5    Special Tests:  SLR: 40 degrees with marked hamstring tightness noted bilaterally    ASSESSMENT   Initial Assessment:  Pt presents with decreased lumbar ROM, decreased LE strength/flexibility and increased pain leading to decreased functional status. Pt would benefit from skilled physical therapy services to address the above deficits and help patient return to prior level of function. Problem List: (Impacting functional limitations): Body Structures, Functions, Activity Limitations Requiring Skilled Therapeutic Intervention: Decreased functional mobility ; Decreased ROM; Decreased strength; Increased pain     Therapy Prognosis:   Therapy Prognosis: Good     Assessment Complexity:   Medium Complexity  PLAN   Effective Dates: 05-26-22 TO Plan of Care/Certification Expiration Date: 08/24/22     Frequency/Duration: Plan Frequency: 2x/wk for 12 weeks     Interventions Planned (Treatment may consist of any combination of the following):    Current Treatment Recommendations: Strengthening; ROM; Manual Therapy - Soft Tissue Mobilization; Manual Therapy - Joint Manipulation; Pain management; Home exercise program; Patient/Caregiver education & training; Modalities; Aquatics     Goals: (Goals have been discussed and agreed upon with patient.)  Short-Term Functional Goals: Time Frame: 4 weeks  1.  Pt will increase lumbar ROM flexion = 45 degrees to assist with daily activities  2. Pt will increase SLR 50 degrees bilaterally to assist with daily activities  3. Pt will be independent with HEP  Discharge Goals: Time Frame: 12 weeks  1. Pt will increase lumbar ROM flexion = 60 degrees to assist with daily activities  2. Pt will increase strength bilateral LE's 5/5 to assist with daily activities  3. Pt will perform 20 minutes household cleaning activities independently with min to no c/o back/buttock pain         Outcome Measure: Tool Used: Modified Oswestry Low Back Pain Questionnaire  Score:  Initial: 17/50  Most Recent: X/50 (Date: -- )   Interpretation of Score: Each section is scored on a 0-5 scale, 5 representing the greatest disability. The scores of each section are added together for a total score of 50. Medical Necessity:   Patient is expected to demonstrate progress in strength, range of motion and functional technique to increase independence with daily activities. Patient demonstrates good rehab potential due to higher previous functional level. Reason For Services/Other Comments:  Patient continues to require skilled intervention due to decreased lumbar ROM, decreased LE strength/flexibility and increased pain leading to decreased functional status. Total Duration:  Time In: 1345  Time Out: [address] therapy, I certify that the treatment plan above will be carried out by a therapist or under their direction.   Thank you for this referral,  Anne Monk PT     Referring Physician Signature: Bertha Lloyd MD _______________________________ Date _____________        Post Session Pain  Charge Capture   POC Link  Treatment Note Link  MD Guidelines  MyChart

## 2022-06-06 ENCOUNTER — HOSPITAL ENCOUNTER (OUTPATIENT)
Dept: PHYSICAL THERAPY | Age: 72
Setting detail: RECURRING SERIES
Discharge: HOME | End: 2022-06-09
Payer: MEDICARE

## 2022-06-06 PROCEDURE — 97110 THERAPEUTIC EXERCISES: CPT

## 2022-06-06 ASSESSMENT — PAIN SCALES - GENERAL: PAINLEVEL_OUTOF10: 4

## 2022-06-06 NOTE — PROGRESS NOTES
Nestor Dunbar  : 1950  Primary: Medicare Part A And B  Secondary:  Nico Johansen[address] [address]  Phone: [phone]  Fax: [phone] Plan Frequency: 2x/wk for 12 weeks    Plan of Care/Certification Expiration Date: 22      PT Visit Info:    No data recorded    OUTPATIENT PHYSICAL THERAPY:OP NOTE TYPE: Treatment Note 2022       Episode   Appt Desk       Treatment Diagnosis:  Low Back Pain (M54.5)  Other intervertebral disc degeneration, lumbar region (M51.36)  Medical/Referring Diagnosis:  Other intervertebral disc degeneration, lumbar region [M51.36]  Referring Physician:  Kathy Fonseca MD MD Orders:  PT Eval and Treat   Date of Onset:  Onset Date: 22     Allergies:  Patient has no allergy information on record. Restrictions/Precautions:    No data recordedNo data recorded   Interventions Planned (Treatment may consist of any combination of the following):    Current Treatment Recommendations: Strengthening; ROM; Manual Therapy - Soft Tissue Mobilization; Manual Therapy - Joint Manipulation; Pain management; Home exercise program; Patient/Caregiver education & training; Modalities; Aquatics     Subjective Comments:  \"I think therapy is going to help. \"  Initial:     4/10 Post Session:     4/10  Medications Last Reviewed:  2022  Updated Objective Findings:  See evaluation note from today  Treatment   THERAPEUTIC EXERCISE: (40 minutes):    Exercises per grid below to improve mobility and strength. Required minimal verbal cues to promote proper body alignment and promote proper body mechanics. Progressed resistance and repetitions as indicated.      Date:  22 Date:  22 Date:     Activity/Exercise Parameters Parameters Parameters   SKTC 3 reps  15 sec holds  (HEP) With towel for assist  3 reps  15 sec holds  B LE's    Pelvic Tilts 10 reps  5 sec holds  (HEP) 15 reps  5 sec holds    Theraband Clam Shells in [employer] Green T-band  15 reps Green T-band  20 reps    NuStep  Level 4  7 minutes    Supine Lumbar Rotation Stretch  10 reps  5 sec holds  Bilaterally    Supine Marching  15 reps  B LE's    Hip Adduction with Ball  15 reps  5 sec holds    Gluteal Sets  10 reps  5 sec holds          Treatment/Session Summary:    · Treatment Assessment:   Pt tolerated all treatments well today with mild c/o fatigue. Added Supine Marching and Gluteal Sets to HEP. · Communication/Consultation:  None today  · Equipment provided today:  None  · Recommendations/Intent for next treatment session: Next visit will focus on progression of therex as tolerable, manual therapy.     Total Treatment Billable Duration:  40 minutes  Time In: 4146  Time Out: [address], PT       Post Session Pain  Charge Capture  [employer] Portal  MD Guidelines  Scanned Media  Benefits  MyChart

## 2022-06-13 ENCOUNTER — HOSPITAL ENCOUNTER (OUTPATIENT)
Dept: PHYSICAL THERAPY | Age: 72
Setting detail: RECURRING SERIES
Discharge: HOME | End: 2022-06-16
Payer: MEDICARE

## 2022-06-13 PROCEDURE — 97110 THERAPEUTIC EXERCISES: CPT

## 2022-06-13 ASSESSMENT — PAIN SCALES - GENERAL: PAINLEVEL_OUTOF10: 0

## 2022-06-13 NOTE — PROGRESS NOTES
Lety Pabon  : 1950  Primary: Corwin Puentes Ppo  Secondary:  Havery Folds  [address] [facility] [address]  Phone: [phone]  Fax: [phone] Plan Frequency: 2x/wk for 12 weeks    Plan of Care/Certification Expiration Date: 22      PT Visit Info: Total # of Visits to Date: 3  Progress Note Counter: 3      OUTPATIENT PHYSICAL THERAPY:OP NOTE TYPE: Treatment Note 2022       Episode   Appt Desk       Treatment Diagnosis:  Low Back Pain (M54.5)  Other intervertebral disc degeneration, lumbar region (M51.36)  Medical/Referring Diagnosis:  Other intervertebral disc degeneration, lumbar region [M51.36]  Referring Physician:  Elmer Rubio MD MD Orders:  PT Eval and Treat   Date of Onset:  Onset Date: 22     Allergies:  Patient has no allergy information on record. Restrictions/Precautions:    No data recordedNo data recorded   Interventions Planned (Treatment may consist of any combination of the following):    Current Treatment Recommendations: Strengthening; ROM; Manual Therapy - Soft Tissue Mobilization; Manual Therapy - Joint Manipulation; Pain management; Home exercise program; Patient/Caregiver education & training; Modalities; Aquatics     Subjective Comments:  Pt states he is having no pain today. His main c/o today is weakness and decreased endurance. Initial:     0/10 Post Session:     0/10  Medications Last Reviewed:  2022  Updated Objective Findings:  None Today  Treatment   THERAPEUTIC EXERCISE: (40 minutes):    Exercises per grid below to improve mobility and strength. Required minimal verbal cues to promote proper body alignment and promote proper body mechanics. Progressed resistance and repetitions as indicated.      Date:  22 Date:  22 Date:  22   Activity/Exercise Parameters Parameters Parameters   SKTC 3 reps  15 sec holds  (HEP) With towel for assist  3 reps  15 sec holds  B Jose A With towel for assist  3 reps  15 sec holds  B LE's   Pelvic Tilts 10 reps  5 sec holds  (HEP) 15 reps  5 sec holds 15 reps  5 sec holds   Theraband Clam Shells in [employer] Green T-band  15 reps Green T-band  20 reps Blue T-band  20 reps   NuStep  Level 4  7 minutes Level 4  8 minutes   Supine Lumbar Rotation Stretch  10 reps  5 sec holds  Bilaterally 10 reps  5 sec holds  Bilaterally   Supine Marching  15 reps  B LE's 15 reps  B LE's   Hip Adduction with Ball  15 reps  5 sec holds 15 reps  5 sec holds   Gluteal Sets  10 reps  5 sec holds 15 reps  5 sec holds   Standing Marching, Hip Abduction and Hip Extension   15 reps each  B LE's         Treatment/Session Summary:    · Treatment Assessment:   Pt tolerated all treatments well today with no c/o. · Communication/Consultation:  None today  · Equipment provided today:  None  · Recommendations/Intent for next treatment session: Next visit will focus on progression of therex as tolerable, manual therapy.     Total Treatment Billable Duration:  40 minutes  Time In: 6842  Time Out: [address], PT       Post Session Pain  Charge Capture  [employer] Portal  MD Guidelines  Scanned Media  Benefits  MyChart

## 2022-06-20 ENCOUNTER — HOSPITAL ENCOUNTER (OUTPATIENT)
Dept: PHYSICAL THERAPY | Age: 72
Setting detail: RECURRING SERIES
Discharge: HOME | End: 2022-06-23
Payer: MEDICARE

## 2022-06-20 PROCEDURE — 97110 THERAPEUTIC EXERCISES: CPT

## 2022-06-20 ASSESSMENT — PAIN SCALES - GENERAL: PAINLEVEL_OUTOF10: 0

## 2022-06-20 NOTE — PROGRESS NOTES
Aparna Kc  : 1950  Primary: Medicare Part A And B  Secondary:  Anna Ivan  [address] [address]  Phone: [phone]  Fax: [phone] Plan Frequency: 2x/wk for 12 weeks    Plan of Care/Certification Expiration Date: 22      PT Visit Info: Total # of Visits to Date: 4  Progress Note Counter: 4      OUTPATIENT PHYSICAL THERAPY:OP NOTE TYPE: Treatment Note 2022       Episode   Appt Desk       Treatment Diagnosis:  Low Back Pain (M54.5)  Other intervertebral disc degeneration, lumbar region (M51.36)  Medical/Referring Diagnosis:  Other intervertebral disc degeneration, lumbar region [M51.36]  Referring Physician:  Robbie Herrera MD MD Orders:  PT Eval and Treat   Date of Onset:  Onset Date: 22     Allergies:  Patient has no allergy information on record. Restrictions/Precautions:    No data recordedNo data recorded   Interventions Planned (Treatment may consist of any combination of the following):    Current Treatment Recommendations: Strengthening; ROM; Manual Therapy - Soft Tissue Mobilization; Manual Therapy - Joint Manipulation; Pain management; Home exercise program; Patient/Caregiver education & training; Modalities; Aquatics     Subjective Comments:  Pt states he is tired today, but he states his pain is doing well. Initial:     0/10 Post Session:     0/10  Medications Last Reviewed:  2022  Updated Objective Findings:  None Today  Treatment   THERAPEUTIC EXERCISE: (40 minutes):    Exercises per grid below to improve mobility and strength. Required minimal verbal cues to promote proper body alignment and promote proper body mechanics. Progressed resistance and repetitions as indicated.      Date:  22 Date:  22 Date:  22   Activity/Exercise Parameters Parameters    SKTC With towel for assist  3 reps  15 sec holds  B LE's With towel for assist  3 reps  15 sec holds  B LE's With Strap for Assist  3 reps  15 sec holds  B LE's   Pelvic Tilts 15 reps  5 sec holds 15 reps  5 sec holds 15 reps  5 sec holds   Theraband Clam Shells in [employer] Green T-band  20 reps Blue T-band  20 reps Black T-band  20 reps   NuStep Level 4  7 minutes Level 4  8 minutes Level 4  8 minutes   Supine Lumbar Rotation Stretch 10 reps  5 sec holds  Bilaterally 10 reps  5 sec holds  Bilaterally    Supine Marching 15 reps  B LE's 15 reps  B LE's 15 reps  B LE's   Hip Adduction with Ball 15 reps  5 sec holds 15 reps  5 sec holds    Gluteal Sets 10 reps  5 sec holds 15 reps  5 sec holds 15 reps  5 sec holds   Standing Marching, Hip Abduction and Hip Extension  15 reps each  B LE's 15 reps each  B LE's   Theraband Rows   Green T-band  20 reps   Theraband Straight Arm Pulldowns   Green T-band  20 reps         Treatment/Session Summary:    · Treatment Assessment:   Pt tolerated all treatments well today with no c/o. · Communication/Consultation:  None today  · Equipment provided today:  None  · Recommendations/Intent for next treatment session: Next visit will focus on progression of therex as tolerable, manual therapy.     Total Treatment Billable Duration:  40 minutes  Time In: 1520  Time Out: [address], PT       Post Session Pain  Charge Capture  [employer] Portal  MD Guidelines  Scanned Media  Benefits  MyChart

## 2022-06-24 ENCOUNTER — APPOINTMENT (RX ONLY)
Dept: URBAN - METROPOLITAN AREA CLINIC 329 | Facility: CLINIC | Age: 72
Setting detail: DERMATOLOGY
End: 2022-06-24

## 2022-06-24 DIAGNOSIS — L21.8 OTHER SEBORRHEIC DERMATITIS: ICD-10-CM

## 2022-06-24 DIAGNOSIS — I87.2 VENOUS INSUFFICIENCY (CHRONIC) (PERIPHERAL): ICD-10-CM

## 2022-06-24 PROCEDURE — ? PRESCRIPTION

## 2022-06-24 PROCEDURE — 99204 OFFICE O/P NEW MOD 45 MIN: CPT

## 2022-06-24 PROCEDURE — ? COUNSELING

## 2022-06-24 RX ORDER — MOMETASONE FUROATE 1 MG/G
OINTMENT TOPICAL
Qty: 45 | Refills: 6 | Status: ERX | COMMUNITY
Start: 2022-06-24

## 2022-06-24 RX ORDER — KETOCONAZOLE 20 MG/G
CREAM TOPICAL
Qty: 60 | Refills: 6 | Status: ERX | COMMUNITY
Start: 2022-06-24

## 2022-06-24 RX ORDER — TRIAMCINOLONE ACETONIDE 1 MG/G
CREAM TOPICAL
Qty: 454 | Refills: 4 | Status: ERX | COMMUNITY
Start: 2022-06-24

## 2022-06-24 RX ORDER — KETOCONAZOLE 20 MG/ML
SHAMPOO, SUSPENSION TOPICAL
Qty: 360 | Refills: 5 | Status: ERX | COMMUNITY
Start: 2022-06-24

## 2022-06-24 RX ADMIN — KETOCONAZOLE: 20 CREAM TOPICAL at 00:00

## 2022-06-24 RX ADMIN — TRIAMCINOLONE ACETONIDE: 1 CREAM TOPICAL at 00:00

## 2022-06-24 RX ADMIN — KETOCONAZOLE: 20 SHAMPOO, SUSPENSION TOPICAL at 00:00

## 2022-06-24 RX ADMIN — MOMETASONE FUROATE: 1 OINTMENT TOPICAL at 00:00

## 2022-06-24 ASSESSMENT — LOCATION DETAILED DESCRIPTION DERM
LOCATION DETAILED: RIGHT INFERIOR CENTRAL MALAR CHEEK
LOCATION DETAILED: LEFT LATERAL FOREHEAD

## 2022-06-24 ASSESSMENT — LOCATION SIMPLE DESCRIPTION DERM
LOCATION SIMPLE: LEFT FOREHEAD
LOCATION SIMPLE: RIGHT CHEEK

## 2022-06-24 ASSESSMENT — LOCATION ZONE DERM: LOCATION ZONE: FACE

## 2022-06-27 ENCOUNTER — HOSPITAL ENCOUNTER (OUTPATIENT)
Dept: PHYSICAL THERAPY | Age: 72
Setting detail: RECURRING SERIES
Discharge: HOME | End: 2022-06-30
Payer: MEDICARE

## 2022-06-27 PROCEDURE — 97110 THERAPEUTIC EXERCISES: CPT

## 2022-06-27 ASSESSMENT — PAIN SCALES - GENERAL: PAINLEVEL_OUTOF10: 0

## 2022-06-27 NOTE — PROGRESS NOTES
Odalys Cherry  : 1950  Primary: Octavia Schroeder Ppo  Secondary:  Viridiana File  [address] [facility] [address]  Phone: [phone]  Fax: [phone] Plan Frequency: 2x/wk for 12 weeks    Plan of Care/Certification Expiration Date: 22      PT Visit Info: Total # of Visits to Date: 4  Progress Note Counter: 4      OUTPATIENT PHYSICAL THERAPY:OP NOTE TYPE: Treatment Note 2022       Episode   Appt Desk       Treatment Diagnosis:  Low Back Pain (M54.5)  Other intervertebral disc degeneration, lumbar region (M51.36)  Medical/Referring Diagnosis:  Other intervertebral disc degeneration, lumbar region [M51.36]  Referring Physician:  Lance Mcneil MD MD Orders:  PT Eval and Treat   Date of Onset:  Onset Date: 22     Allergies:  Patient has no allergy information on record. Restrictions/Precautions:    No data recordedNo data recorded   Interventions Planned (Treatment may consist of any combination of the following):    Current Treatment Recommendations: Strengthening; ROM; Manual Therapy - Soft Tissue Mobilization; Manual Therapy - Joint Manipulation; Pain management; Home exercise program; Patient/Caregiver education & training; Modalities; Aquatics     Subjective Comments:  Pt states he is feeling really good today. Initial:     0/10 Post Session:     0/10  Medications Last Reviewed:  2022  Updated Objective Findings:  None Today  Treatment   THERAPEUTIC EXERCISE: (40 minutes):    Exercises per grid below to improve mobility and strength. Required minimal verbal cues to promote proper body alignment and promote proper body mechanics. Progressed resistance and repetitions as indicated.      Date:  22 Date:  22 Date:  22   Activity/Exercise Parameters     SKTC With towel for assist  3 reps  15 sec holds  B LE's With Strap for Assist  3 reps  15 sec holds  B LE's With strap for assist  3 reps  15 sec holds  B LE's   Pelvic Tilts 15 reps  5 sec holds 15 reps  5 sec holds 15 reps  5 sec holds   Theraband Clam Shells in [employer] T-band  20 reps Black T-band  20 reps Black T-band  20 reps    NuStep Level 4  8 minutes Level 4  8 minutes Level 4  8 minutes   Supine Lumbar Rotation Stretch 10 reps  5 sec holds  Bilaterally     Supine Marching 15 reps  B LE's 15 reps  B LE's 20 reps  B LE's   Hip Adduction with Ball 15 reps  5 sec holds     Gluteal Sets 15 reps  5 sec holds 15 reps  5 sec holds 15 reps  5 sec holds   Standing Marching, Hip Abduction and Hip Extension 15 reps each  B LE's 15 reps each  B LE's 20 reps each  B LE's   Theraband Rows  Green T-band  20 reps Green T-band  20 reps   Theraband Straight Arm Pulldowns  Green T-band  20 reps Green T-band  20 reps         Treatment/Session Summary:    · Treatment Assessment:   Pt tolerated all treatments well today with no c/o. Strength and pain improving. · Communication/Consultation:  None today  · Equipment provided today:  None  · Recommendations/Intent for next treatment session: Next visit will focus on progression of therex as tolerable, manual therapy.     Total Treatment Billable Duration:  40 minutes  Time In: 2779  Time Out: [address], PT       Post Session Pain  Charge Capture  [employer] Portal  MD Guidelines  Scanned Media  Benefits  MyChart

## 2022-07-05 ENCOUNTER — HOSPITAL ENCOUNTER (OUTPATIENT)
Dept: PHYSICAL THERAPY | Age: 72
Setting detail: RECURRING SERIES
Discharge: HOME | End: 2022-07-08
Payer: MEDICARE

## 2022-07-05 PROCEDURE — 97110 THERAPEUTIC EXERCISES: CPT

## 2022-07-05 ASSESSMENT — PAIN SCALES - GENERAL: PAINLEVEL_OUTOF10: 0

## 2022-07-05 NOTE — PROGRESS NOTES
Daniel Espino  : 1950  Primary: Hortensia Champion Ppo  Secondary:  Michelle Gutierrez  [address] [facility] [address]  Phone: [phone]  Fax: [phone] Plan Frequency: 2x/wk for 12 weeks    Plan of Care/Certification Expiration Date: 22      PT Visit Info: Total # of Visits to Date: 4  Progress Note Counter: 4      OUTPATIENT PHYSICAL THERAPY:OP NOTE TYPE: Treatment Note 2022       Episode   Appt Desk       Treatment Diagnosis:  Low Back Pain (M54.5)  Other intervertebral disc degeneration, lumbar region (M51.36)  Medical/Referring Diagnosis:  Other intervertebral disc degeneration, lumbar region [M51.36]  Referring Physician:  Yina Crespo MD MD Orders:  PT Eval and Treat   Date of Onset:  Onset Date: 22     Allergies:  Patient has no allergy information on record. Restrictions/Precautions:    No data recordedNo data recorded   Interventions Planned (Treatment may consist of any combination of the following):    Current Treatment Recommendations: Strengthening; ROM; Manual Therapy - Soft Tissue Mobilization; Manual Therapy - Joint Manipulation; Pain management; Home exercise program; Patient/Caregiver education & training; Modalities; Aquatics     Subjective Comments:  Pt states he is feeling pretty good today. Initial:     0/10 Post Session:     0/10  Medications Last Reviewed:  2022  Updated Objective Findings:  None Today  Treatment   THERAPEUTIC EXERCISE: (40 minutes):    Exercises per grid below to improve mobility and strength. Required minimal verbal cues to promote proper body alignment and promote proper body mechanics. Progressed resistance and repetitions as indicated.      Date:  22 Date:  22 Date:  22   Activity/Exercise      SKTC With Strap for Assist  3 reps  15 sec holds  B LE's With strap for assist  3 reps  15 sec holds  B LE's With strap for assist  3 reps  15 sec holds  B LE's   Pelvic Tilts 15 reps  5 sec holds 15 reps  5 sec holds 15 reps  5 sec holds   Theraband Clam Shells in [employer] T-band  20 reps Black T-band  20 reps  Black T-band  20 reps   NuStep Level 4  8 minutes Level 4  8 minutes Level 4  8 minutes   Supine Lumbar Rotation Stretch      Supine Marching 15 reps  B LE's 20 reps  B LE's 20 reps  B LE's   Hip Adduction with Ball      Gluteal Sets 15 reps  5 sec holds 15 reps  5 sec holds    Standing Marching, Hip Abduction and Hip Extension 15 reps each  B LE's 20 reps each  B LE's 20 reps  B LE's   Theraband Rows Green T-band  20 reps Green T-band  20 reps Green T-band  20 reps   Theraband Straight Arm Pulldowns Green T-band  20 reps Green T-band  20 reps Green T-band  20 reps         Treatment/Session Summary:    · Treatment Assessment:   Pt tolerated all treatments well today with no c/o. · Communication/Consultation:  None today  · Equipment provided today:  None  · Recommendations/Intent for next treatment session: Next visit will focus on progression of therex as tolerable, manual therapy.     Total Treatment Billable Duration:  40 minutes  Time In: 7103  Time Out: [address], PT       Post Session Pain  Charge Capture  [employer] Portal  MD Guidelines  Scanned Media  Benefits  MyChart

## 2022-07-07 DIAGNOSIS — R97.20 ELEVATED PSA: ICD-10-CM

## 2022-07-07 DIAGNOSIS — E78.2 MIXED HYPERLIPIDEMIA: ICD-10-CM

## 2022-07-07 DIAGNOSIS — R73.03 PREDIABETES: Primary | ICD-10-CM

## 2022-07-07 DIAGNOSIS — Z12.11 SCREEN FOR COLON CANCER: ICD-10-CM

## 2022-07-07 DIAGNOSIS — I10 ESSENTIAL HYPERTENSION: ICD-10-CM

## 2022-07-07 DIAGNOSIS — E03.4 HYPOTHYROIDISM DUE TO ACQUIRED ATROPHY OF THYROID: ICD-10-CM

## 2022-07-07 DIAGNOSIS — E55.9 VITAMIN D INSUFFICIENCY: ICD-10-CM

## 2022-07-11 ENCOUNTER — NURSE ONLY (OUTPATIENT)
Dept: INTERNAL MEDICINE CLINIC | Facility: CLINIC | Age: 72
End: 2022-07-11

## 2022-07-11 ENCOUNTER — HOSPITAL ENCOUNTER (OUTPATIENT)
Dept: PHYSICAL THERAPY | Age: 72
Setting detail: RECURRING SERIES
End: 2022-07-11
Payer: MEDICARE

## 2022-07-11 DIAGNOSIS — E78.2 MIXED HYPERLIPIDEMIA: ICD-10-CM

## 2022-07-11 DIAGNOSIS — E55.9 VITAMIN D INSUFFICIENCY: ICD-10-CM

## 2022-07-11 DIAGNOSIS — I10 ESSENTIAL HYPERTENSION: ICD-10-CM

## 2022-07-11 DIAGNOSIS — R97.20 ELEVATED PSA: ICD-10-CM

## 2022-07-11 DIAGNOSIS — R73.03 PREDIABETES: ICD-10-CM

## 2022-07-11 DIAGNOSIS — E03.4 HYPOTHYROIDISM DUE TO ACQUIRED ATROPHY OF THYROID: ICD-10-CM

## 2022-07-11 LAB
ALBUMIN SERPL-MCNC: 4.1 G/DL (ref 3.2–4.6)
ALBUMIN/GLOB SERPL: 1.5 {RATIO} (ref 1.2–3.5)
ALP SERPL-CCNC: 76 U/L (ref 50–136)
ALT SERPL-CCNC: 33 U/L (ref 12–65)
ANION GAP SERPL CALC-SCNC: 10 MMOL/L (ref 7–16)
AST SERPL-CCNC: 26 U/L (ref 15–37)
BILIRUB SERPL-MCNC: 1.3 MG/DL (ref 0.2–1.1)
BUN SERPL-MCNC: 15 MG/DL (ref 8–23)
CALCIUM SERPL-MCNC: 9.4 MG/DL (ref 8.3–10.4)
CHLORIDE SERPL-SCNC: 110 MMOL/L (ref 98–107)
CHOLEST SERPL-MCNC: 181 MG/DL
CO2 SERPL-SCNC: 26 MMOL/L (ref 21–32)
CREAT SERPL-MCNC: 1.1 MG/DL (ref 0.8–1.5)
GLOBULIN SER CALC-MCNC: 2.7 G/DL (ref 2.3–3.5)
GLUCOSE SERPL-MCNC: 113 MG/DL (ref 65–100)
HDLC SERPL-MCNC: 42 MG/DL (ref 40–60)
HDLC SERPL: 4.3 {RATIO}
LDLC SERPL CALC-MCNC: 116.6 MG/DL
POTASSIUM SERPL-SCNC: 4.2 MMOL/L (ref 3.5–5.1)
PROT SERPL-MCNC: 6.8 G/DL (ref 6.3–8.2)
PSA SERPL-MCNC: 5.2 NG/ML
SODIUM SERPL-SCNC: 146 MMOL/L (ref 136–145)
T4 FREE SERPL-MCNC: 1.1 NG/DL (ref 0.78–1.46)
TRIGL SERPL-MCNC: 112 MG/DL (ref 35–150)
TSH, 3RD GENERATION: 2.78 UIU/ML (ref 0.36–3.74)
VLDLC SERPL CALC-MCNC: 22.4 MG/DL (ref 6–23)

## 2022-07-11 NOTE — PROGRESS NOTES
Stephanie Gaming  : 1950  Primary: Stephenie Vasquez Medicare  Secondary:  Jose Antonio Loja  [address] [address]  Phone: [phone]  Fax: [phone]    PT Visit Info: Total # of Visits to Date: 4  Progress Note Counter: 4     OT Visit Info:  No data recorded    OUTPATIENT THERAPY:OP NOTE TYPE: Progress Report 2022               Episode  Appt Desk           Stephanie Gaming cancelled his appointment for today due to personal issues. Will plan to follow up next during next appointment.   Thank you,  Francisco Javier Mcguire, PT    Future Appointments   Date Time Provider Kaci Bose   2022 11:00 AM MD PEÑA Burton AMB   2022  3:15 PM Laisha De La Fuente, PTA [facility]   2022  3:15 PM Francisco Javier Mcguire, PT [facility]   2022  9:00 AM MD MITCHELL Walker [facility] AMB

## 2022-07-12 LAB
25(OH)D3 SERPL-MCNC: 23.3 NG/ML (ref 30–100)
BASOPHILS # BLD: 0 K/UL (ref 0–0.2)
BASOPHILS NFR BLD: 1 % (ref 0–2)
DIFFERENTIAL METHOD BLD: NORMAL
EOSINOPHIL # BLD: 0.2 K/UL (ref 0–0.8)
EOSINOPHIL NFR BLD: 3 % (ref 0.5–7.8)
ERYTHROCYTE [DISTWIDTH] IN BLOOD BY AUTOMATED COUNT: 13.3 % (ref 11.9–14.6)
EST. AVERAGE GLUCOSE BLD GHB EST-MCNC: 108 MG/DL
HBA1C MFR BLD: 5.4 % (ref 4.8–5.6)
HCT VFR BLD AUTO: 44.8 % (ref 41.1–50.3)
HGB BLD-MCNC: 14.5 G/DL (ref 13.6–17.2)
IMM GRANULOCYTES # BLD AUTO: 0 K/UL (ref 0–0.5)
IMM GRANULOCYTES NFR BLD AUTO: 1 % (ref 0–5)
LYMPHOCYTES # BLD: 1.1 K/UL (ref 0.5–4.6)
LYMPHOCYTES NFR BLD: 16 % (ref 13–44)
MCH RBC QN AUTO: 30.1 PG (ref 26.1–32.9)
MCHC RBC AUTO-ENTMCNC: 32.4 G/DL (ref 31.4–35)
MCV RBC AUTO: 92.9 FL (ref 79.6–97.8)
MONOCYTES # BLD: 0.5 K/UL (ref 0.1–1.3)
MONOCYTES NFR BLD: 7 % (ref 4–12)
NEUTS SEG # BLD: 5.2 K/UL (ref 1.7–8.2)
NEUTS SEG NFR BLD: 72 % (ref 43–78)
NRBC # BLD: 0 K/UL (ref 0–0.2)
PLATELET # BLD AUTO: 203 K/UL (ref 150–450)
PMV BLD AUTO: 10.6 FL (ref 9.4–12.3)
RBC # BLD AUTO: 4.82 M/UL (ref 4.23–5.6)
WBC # BLD AUTO: 7.1 K/UL (ref 4.3–11.1)

## 2022-07-15 LAB
APPEARANCE UR: CLEAR
BACTERIA URNS QL MICRO: NEGATIVE /HPF
BILIRUB UR QL: NEGATIVE
CASTS URNS QL MICRO: ABNORMAL /LPF
COLOR UR: ABNORMAL
EPI CELLS #/AREA URNS HPF: ABNORMAL /HPF
GLUCOSE UR STRIP.AUTO-MCNC: NEGATIVE MG/DL
HGB UR QL STRIP: ABNORMAL
KETONES UR QL STRIP.AUTO: NEGATIVE MG/DL
LEUKOCYTE ESTERASE UR QL STRIP.AUTO: NEGATIVE
NITRITE UR QL STRIP.AUTO: NEGATIVE
PH UR STRIP: 5.5 [PH] (ref 5–9)
PROT UR STRIP-MCNC: NEGATIVE MG/DL
RBC #/AREA URNS HPF: ABNORMAL /HPF
SP GR UR REFRACTOMETRY: 1.02 (ref 1–1.02)
UROBILINOGEN UR QL STRIP.AUTO: 0.2 EU/DL (ref 0.2–1)
WBC URNS QL MICRO: ABNORMAL /HPF

## 2022-07-18 ENCOUNTER — APPOINTMENT (OUTPATIENT)
Dept: PHYSICAL THERAPY | Age: 72
End: 2022-07-18
Payer: MEDICARE

## 2022-07-22 ENCOUNTER — COMMUNITY OUTREACH (OUTPATIENT)
Dept: INTERNAL MEDICINE CLINIC | Facility: CLINIC | Age: 72
End: 2022-07-22

## 2022-07-22 ENCOUNTER — TELEMEDICINE (OUTPATIENT)
Dept: INTERNAL MEDICINE CLINIC | Facility: CLINIC | Age: 72
End: 2022-07-22
Payer: MEDICARE

## 2022-07-22 DIAGNOSIS — I48.3 TYPICAL ATRIAL FLUTTER (HCC): ICD-10-CM

## 2022-07-22 DIAGNOSIS — R73.03 PREDIABETES: ICD-10-CM

## 2022-07-22 DIAGNOSIS — E78.2 MIXED HYPERLIPIDEMIA: ICD-10-CM

## 2022-07-22 DIAGNOSIS — M1A.09X0 IDIOPATHIC CHRONIC GOUT OF MULTIPLE SITES WITHOUT TOPHUS: ICD-10-CM

## 2022-07-22 DIAGNOSIS — E03.4 HYPOTHYROIDISM DUE TO ACQUIRED ATROPHY OF THYROID: ICD-10-CM

## 2022-07-22 DIAGNOSIS — R97.20 ELEVATED PSA: ICD-10-CM

## 2022-07-22 DIAGNOSIS — I10 ESSENTIAL HYPERTENSION: Primary | ICD-10-CM

## 2022-07-22 DIAGNOSIS — E66.01 MORBID OBESITY WITH BODY MASS INDEX (BMI) OF 40.0 TO 44.9 IN ADULT (HCC): ICD-10-CM

## 2022-07-22 DIAGNOSIS — G47.33 OSA (OBSTRUCTIVE SLEEP APNEA): ICD-10-CM

## 2022-07-22 PROCEDURE — 1123F ACP DISCUSS/DSCN MKR DOCD: CPT | Performed by: INTERNAL MEDICINE

## 2022-07-22 PROCEDURE — 99214 OFFICE O/P EST MOD 30 MIN: CPT | Performed by: INTERNAL MEDICINE

## 2022-07-22 RX ORDER — ALLOPURINOL 300 MG/1
300 TABLET ORAL DAILY
Qty: 90 TABLET | Refills: 1 | Status: SHIPPED | OUTPATIENT
Start: 2022-07-22

## 2022-07-22 RX ORDER — LEVOTHYROXINE SODIUM 0.2 MG/1
200 TABLET ORAL
Qty: 90 TABLET | Refills: 1 | Status: SHIPPED | OUTPATIENT
Start: 2022-07-22

## 2022-07-22 RX ORDER — LOSARTAN POTASSIUM 100 MG/1
100 TABLET ORAL DAILY
Qty: 90 TABLET | Refills: 1 | Status: SHIPPED | OUTPATIENT
Start: 2022-07-22

## 2022-07-22 ASSESSMENT — ENCOUNTER SYMPTOMS: RESPIRATORY NEGATIVE: 1

## 2022-07-22 NOTE — PROGRESS NOTES
monitoring blood pressure at home and keeping a log, with goal blood pressure <140/90. Home bp readings are: 138/81      Past Medical History:   Diagnosis Date    Arrhythmia 2006    LAST ECHO 2006 ? APCs    Elevated PSA     Dr Marek Thayer     Gout     medication    Hematuria 8/4/2011    Pre op      History of hepatitis A 1980    Hypertension     controlled with medication    Malaria 1973    Mixed hyperlipidemia     ascvd risk 20.5 in 2016    Morbid obesity ([address]     Osteoarthritis of hip 4/6/2011    LEFT TKA    Pre-diabetes     Sleep apnea 05/14/2019    new diagnosis - not received cpap yet    Thyroid disease     hypothyroidism - medication       Past Surgical History:   Procedure Laterality Date    ABLATION OF DYSRHYTHMIC FOCUS  2019    COLONOSCOPY  2012    HEENT      lens implants    [address]    left middle finger repair bone chip    PROSTATE BIOPSY, NEEDLE, SATURATION SAMPLING      TONSILLECTOMY  1956    TOTAL HIP ARTHROPLASTY  4/2011    LEFT and right       Family History   Problem Relation Age of Onset    Lung Disease Mother         CHF    Lung Disease Father     Pseudochol. Deficiency Neg Hx     Post-op Cognitive Dysfunction Neg Hx     Emergence Delirium Neg Hx     Post-op Nausea/Vomiting Neg Hx     Delayed Awakening Neg Hx     Cancer Father         liver cancer    Other Sister     [place] Herald Hypertherm Neg Hx        Social History     Tobacco Use    Smoking status: Never    Smokeless tobacco: Never   Substance Use Topics    Alcohol use: Yes     Alcohol/week: 7.0 standard drinks    Drug use:  No                 Current Outpatient Medications:     levothyroxine (SYNTHROID) 200 MCG tablet, Take 1 tablet by mouth every morning (before breakfast), Disp: 90 tablet, Rfl: 1    losartan (COZAAR) 100 MG tablet, Take 1 tablet by mouth in the morning., Disp: 90 tablet, Rfl: 1    allopurinol (ZYLOPRIM) 300 MG tablet, Take 1 tablet by mouth in the morning., Disp: 90 tablet, Rfl: 1    aspirin 81 MG EC tablet, Take by mouth daily, Disp: , Rfl:     Allergies   Allergen Reactions    Other Other (See Comments)     Wasp. Dizziness; syncopal...the patient tested states not allergic to wasp 8/2/21       Review of Systems   Constitutional: Negative. HENT: Negative. Respiratory: Negative. Cardiovascular: Negative. Objective: There were no vitals taken for this visit. Examination:  Physical Exam  Neurological:      Mental Status: He is alert. Psychiatric:         Mood and Affect: Mood normal.         Thought Content: Thought content normal.         Judgment: Judgment normal.         Assessment/Plan:    Hayes Henriquez was seen today for follow-up. Diagnoses and all orders for this visit:    Essential hypertension  Stable, continue medication, diet. -     losartan (COZAAR) 100 MG tablet; Take 1 tablet by mouth in the morning.  -     Urinalysis; Future    Typical atrial flutter (HCC)  Stable. KISHORE (obstructive sleep apnea)  Recommended using CPAP nightly and he will try to improve compliance; Hypothyroidism due to acquired atrophy of thyroid  Stable on present medications, continue as prescribed    -     levothyroxine (SYNTHROID) 200 MCG tablet; Take 1 tablet by mouth every morning (before breakfast)  -     T4, Free; Future  -     TSH; Future    Prediabetes  Recommended low fat, low cholesterol, low carbohydrate diet and regular exercise. Patient is at risk for developing diabetes, and will strive to make lifestyle modifications. Will check HgbA1c at next office visit. -     Hemoglobin A1C; Future    Morbid obesity with body mass index (BMI) of 40.0 to 44.9 in adult [facility])  Recommended low fat, low cholesterol, low carbohydrate diet. Recommended increasing fresh fruits and vegetables in diet, eating lean meats, like fish and poultry, that are baked, broiled or grilled, not fried. Recommended regular exercise, 30 minutes of aerobic activity 4-5 days a week.   Recommended monitoring weight closely and keeping follow-up appointments for recheck. Mixed hyperlipidemia  Stable, continue medication, diet. -     CBC with Auto Differential; Future  -     Comprehensive Metabolic Panel; Future  -     Lipid Panel; Future    Idiopathic chronic gout of multiple sites without tophus  Stable, continue medication, diet. -     allopurinol (ZYLOPRIM) 300 MG tablet; Take 1 tablet by mouth in the morning.  -     Uric Acid; Future    Elevated PSA  -     Norman Smith MD, Urology, [place]  -     PSA, Total and Free; Future        Follow-up and Dispositions    Return in about 6 months (around 1/22/2023), or if symptoms worsen or fail to improve, for medicare annual w/ labs. Medication Reconciliation:  Current Medications Verified: Current medications/ immunizations were reviewed, including purpose, with patient. Family History, Social History, Current and Past Medical History was reviewed with patient and updated at today's office visit. Medication Reconciliation list was given to patient/ family. Patient was advised to discard old medication lists and provide all providers with current list at each visit and carry list with them in case of emergency.     Completed By:   Aaron Alvarado MD    [facility] [address], [address] [place], [address]  [phone]  [phone] fax  10:13 AM

## 2022-07-22 NOTE — PROGRESS NOTES
Patient's HM shows they are overdue for Colorectal Screening. Care Everywhere and  files searched. No results to attach to order nor HM updated.

## 2022-07-25 ENCOUNTER — HOSPITAL ENCOUNTER (OUTPATIENT)
Dept: PHYSICAL THERAPY | Age: 72
Setting detail: RECURRING SERIES
Discharge: HOME | End: 2022-07-28
Payer: MEDICARE

## 2022-07-25 PROCEDURE — 97110 THERAPEUTIC EXERCISES: CPT

## 2022-07-25 ASSESSMENT — PAIN SCALES - GENERAL: PAINLEVEL_OUTOF10: 4

## 2022-07-25 NOTE — PROGRESS NOTES
Manan Borjas  : 1950  Primary: [facility]  Secondary:  Sofiya Campos  [facility] [address]  [address] [place] [address] Way [address]  Phone: [phone]  Fax: [phone] Plan Frequency: 2x/wk for 12 weeks    Plan of Care/Certification Expiration Date: 22      PT Visit Info: Total # of Visits to Date: 5  Progress Note Counter: 1      OUTPATIENT PHYSICAL THERAPY:OP NOTE TYPE: Initial Assessment 2022               Episode  Appt Desk         Treatment Diagnosis:  Low Back Pain (M54.5)  Other intervertebral disc degeneration, lumbar region (M51.36)  * No diagnoses found *  Medical/Referring Diagnosis:  Other intervertebral disc degeneration, lumbar region [M51.36]  Referring Physician:  Sherie Giles MD MD Orders:  PT Eval and Treat   Return MD Appt:  2022  Date of Onset:  Onset Date: 22     Allergies: Other  Restrictions/Precautions:    No data recordedNo data recorded   Medications Last Reviewed:  2022     SUBJECTIVE   History of Injury/Illness (Reason for Referral):  Pt states 9 weeks ago he was sitting at work and when he stood up he had pain in his R buttocks. He states when he walked the pain gradually subsided. He states he saw MD recently and she wanted him to have an MRI but insurance is wanting him to go through 6 weeks of PT first.  He started using a rolling walker May 10 due to difficulties walking due to the R buttock pain. He had x-rays which revealed lumbar disc degeneration. He carries his wallet in his R back pocket. Pt rates his current R buttock pain 1/10 sitting at rest, increasing to 6/10 at worst over the past week. Pt works a seated job-sits all day at work and takes minimal breaks. Pt denies any radiating pain or numbness/tingling into his LE's. Pt is not taking any medications for his R buttock pain.   Pt states he started drinking barrel whiskey around the time that his pain started which he thinks may have contributed to his current symptoms. Patient Stated Goal(s): To get rid of pain  Initial:     4/10 Post Session:     4/10  Past Medical History/Comorbidities:   Mr. Jason Kovacs  has a past medical history of Arrhythmia, Elevated PSA, Gout, Hematuria, History of hepatitis A, Hypertension, Malaria, Mixed hyperlipidemia, Morbid obesity ([address] Osteoarthritis of hip, Pre-diabetes, Sleep apnea, and Thyroid disease. Mr. Jason Kovacs  has a past surgical history that includes Tonsillectomy (1956); prostate biopsy, needle, saturation sampling; heent; Colonoscopy (2012); orthopedic surgery (1965); ablation of dysrhythmic focus (2019); and total hip arthroplasty (4/2011). Social His tory/Living Environment:   Lives With: Spouse  Type of Home: House     Prior Level of Function/Work/Activity:   Prior level of function: Independent  Current level of function: Pt is currently having difficulties with household ADL's due to his buttock pain  No data recordedNo data recorded   Learning:   Does the patient/guardian have any barriers to learning?: No barriers  Will there be a co-learner?: No  What is the preferred language of the patient/guardian?: English  Is an  required?: No  How does the patient/guardian prefer to learn new concepts?: Demonstration; Pictures/Videos     Fall Risk Scale: Total Score: 15  Tsai Fall Risk: Low (0-24)     Dominant Side:  right handed    Personal Factors:        Sex:  male        Age:  67 y.o. Profession:  Pt works a desk job        OBJECTIVE   Observations:      Forward head, rounded shoulders    Ambulation/WC:     Pt walks with a rolling walker    Functional Mobility:    Pt able to transition sit to supine and supine to sit independently     Dermatomes:   Sensation intact to light touch B LE's    Reflexes:   DTR's 1+ to bilateral patellar and achilles    Lumbar:   Palpation:  Tenderness bilateral piriformis and gluteals    ROM:  Lumbar Flexion = 50 degrees (increased spasms in bilateral buttocks)  Lumbar Extension = 10 degrees  Lumbar Side Bending R = 23 degrees  Lumbar Side Bending L = 25 degrees    Strength:  R hip flexion = 4+/5  R hip abduction = 5/5  R hip adduction = 5/5  R knee extension = 5/5  R knee flexion = 5/5  R ankle dorsiflexion = 4-/5  R ankle plantarflexion = 5/5    L hip flexion = 4+/5  L hip abduction = 5/5  L hip adduction = 5/5  L knee extension = 5/5  L knee flexion = 5/5  L ankle dorsiflexion = 4-/5  L ankle plantarflexion = 5/5    Special Tests:  SLR: 40 degrees with marked hamstring tightness noted bilaterally    ASSESSMENT   Initial Assessment:  Pt has attended 7 visits of physical therapy from 05-26-22 to 07-25-22 with increased lumbar ROM and increased LE strength. Pt would benefit from continued skilled physical therapy services to help return to prior level of function. Problem List: (Impacting functional limitations): Body Structures, Functions, Activity Limitations Requiring Skilled Therapeutic Intervention: Decreased functional mobility ; Decreased ROM; Decreased strength; Increased pain     Therapy Prognosis:   Therapy Prognosis: Good     Assessment Complexity:   Medium Complexity  PLAN   Effective Dates: 05-26-22 TO Plan of Care/Certification Expiration Date: 08/24/22     Frequency/Duration: Plan Frequency: 2x/wk for 12 weeks     Interventions Planned (Treatment may consist of any combination of the following):    Current Treatment Recommendations: Strengthening; ROM; Manual Therapy - Soft Tissue Mobilization; Manual Therapy - Joint Manipulation; Pain management; Home exercise program; Patient/Caregiver education & training; Modalities;  Aquatics     Goals: (Goals have been discussed and agreed upon with patient.)  Short-Term Functional Goals: Time Frame: 4 weeks  Pt will increase lumbar ROM flexion = 45 degrees to assist with daily activities (Goal Met)  Pt will increase SLR 50 degrees bilaterally to assist with daily activities  Pt will be independent with HEP (Goal Met)  Discharge Goals: Time Frame: 12 weeks  Pt will increase lumbar ROM flexion = 60 degrees to assist with daily activities  Pt will increase strength bilateral LE's 5/5 to assist with daily activities  Pt will perform 20 minutes household cleaning activities independently with min to no c/o back/buttock pain         Outcome Measure: Tool Used: Modified Oswestry Low Back Pain Questionnaire  Score:  Initial: 17/50  Most Recent: 13/50 (Date: 07-25-22)   Interpretation of Score: Each section is scored on a 0-5 scale, 5 representing the greatest disability. The scores of each section are added together for a total score of 50. Medical Necessity:   Patient is expected to demonstrate progress in strength, range of motion and functional technique to increase independence with daily activities. Patient demonstrates good rehab potential due to higher previous functional level. Reason For Services/Other Comments:  Patient continues to require skilled intervention due to decreased lumbar ROM, decreased LE strength/flexibility and increased pain leading to decreased functional status. Total Duration:  Time In: 1600  Time Out: 1645    Regarding Nga Luo therapy, I certify that the treatment plan above will be carried out by a therapist or under their direction.   Thank you for this referral,  Rene Dumont, PT     Referring Physician Signature: Sania Douglas MD _______________________________ Date _____________        Post Session Pain  Charge Capture   POC Link  Treatment Note Link  MD Guidelines  [facility]

## 2022-08-01 ENCOUNTER — APPOINTMENT (OUTPATIENT)
Dept: PHYSICAL THERAPY | Age: 72
End: 2022-08-01
Payer: MEDICARE

## 2022-08-08 ENCOUNTER — HOSPITAL ENCOUNTER (OUTPATIENT)
Dept: PHYSICAL THERAPY | Age: 72
Setting detail: RECURRING SERIES
Discharge: HOME | End: 2022-08-11
Payer: MEDICARE

## 2022-08-08 PROCEDURE — 97110 THERAPEUTIC EXERCISES: CPT

## 2022-08-08 ASSESSMENT — PAIN SCALES - GENERAL: PAINLEVEL_OUTOF10: 0

## 2022-08-15 ENCOUNTER — HOSPITAL ENCOUNTER (OUTPATIENT)
Dept: PHYSICAL THERAPY | Age: 72
Setting detail: RECURRING SERIES
End: 2022-08-15
Payer: MEDICARE

## 2022-08-15 NOTE — PROGRESS NOTES
Michelle Segal  : 1950  Primary: [employer] Medicare  Secondary:  [facility]  [address] [facility] [address]  Phone: [phone]  Fax: [phone]    PT Visit Info: Total # of Visits to Date: 6  Progress Note Counter: 2     OT Visit Info:  No data recorded    OUTPATIENT THERAPY:OP NOTE TYPE: Progress Report 8/15/2022               Episode  Appt Desk           Michelle Segal cancelled his appointment for today due to unknown reasons. Will plan to follow up next during next appointment.   Thank you,  Krystian Panchla, PT    Future Appointments   Date Time Provider Kaci Bose   8/15/2022  3:15 PM Krystian Panchal, PT [facility]   2022  9:00 AM Connor White MD BSNI [facility] AMB   2022  3:15 PM Nimisha Armas PTA [facility]   [facility]   10/3/2022  3:00 PM Malina Tineo MD PGLEONIEU [facility] AMB   2023 11:30 AM PEÑA POMPA [facility] AMB   2023  2:00 PM MD PEÑA Cunningham [facility] AMB

## 2022-08-22 ENCOUNTER — OFFICE VISIT (OUTPATIENT)
Dept: NEUROLOGY | Age: 72
End: 2022-08-22
Payer: MEDICARE

## 2022-08-22 ENCOUNTER — HOSPITAL ENCOUNTER (OUTPATIENT)
Dept: PHYSICAL THERAPY | Age: 72
Setting detail: RECURRING SERIES
End: 2022-08-22
Payer: MEDICARE

## 2022-08-22 VITALS
SYSTOLIC BLOOD PRESSURE: 120 MMHG | BODY MASS INDEX: 40.6 KG/M2 | HEART RATE: 80 BPM | WEIGHT: 290 LBS | DIASTOLIC BLOOD PRESSURE: 78 MMHG | HEIGHT: 71 IN

## 2022-08-22 DIAGNOSIS — R20.0 NUMBNESS AND TINGLING: ICD-10-CM

## 2022-08-22 DIAGNOSIS — R20.0 NUMBNESS AND TINGLING OF BOTH FEET: ICD-10-CM

## 2022-08-22 DIAGNOSIS — R20.2 NUMBNESS AND TINGLING OF BOTH FEET: ICD-10-CM

## 2022-08-22 DIAGNOSIS — R20.2 NUMBNESS AND TINGLING: ICD-10-CM

## 2022-08-22 DIAGNOSIS — G72.9 MYOPATHY: ICD-10-CM

## 2022-08-22 DIAGNOSIS — G62.9 AXONAL POLYNEUROPATHY: Primary | ICD-10-CM

## 2022-08-22 DIAGNOSIS — G62.9 AXONAL POLYNEUROPATHY: ICD-10-CM

## 2022-08-22 DIAGNOSIS — R29.898 LEG WEAKNESS, BILATERAL: ICD-10-CM

## 2022-08-22 LAB — VIT B12 SERPL-MCNC: 347 PG/ML (ref 193–986)

## 2022-08-22 PROCEDURE — 1123F ACP DISCUSS/DSCN MKR DOCD: CPT | Performed by: PSYCHIATRY & NEUROLOGY

## 2022-08-22 PROCEDURE — 99204 OFFICE O/P NEW MOD 45 MIN: CPT | Performed by: PSYCHIATRY & NEUROLOGY

## 2022-08-22 PROCEDURE — 95910 NRV CNDJ TEST 7-8 STUDIES: CPT | Performed by: PSYCHIATRY & NEUROLOGY

## 2022-08-22 PROCEDURE — 95886 MUSC TEST DONE W/N TEST COMP: CPT | Performed by: PSYCHIATRY & NEUROLOGY

## 2022-08-22 PROCEDURE — 95885 MUSC TST DONE W/NERV TST LIM: CPT | Performed by: PSYCHIATRY & NEUROLOGY

## 2022-08-22 ASSESSMENT — VISUAL ACUITY: VA_NORMAL: 1

## 2022-08-22 NOTE — PROGRESS NOTES
Manan Borjas  : 1950  Primary: [employer] Vogt Medicare  Secondary:  Sofiya Campos  [facility] [address]  [address] [address]  Phone: [phone]  Fax: [phone]    PT Visit Info: Total # of Visits to Date: 7  Progress Note Counter: 3     OT Visit Info:  No data recorded    OUTPATIENT THERAPY:OP NOTE TYPE: Progress Report 2022               Episode  Appt Desk           Manan Borjas cancelled his appointment for today due to unknown reasons and this is patients 3rd cx to date, if he continues to miss appointments then patient will be Discharged from PT secondary to not coming to his scheduled appointments . He has one more visit, if he fails to show up or cx he will be Discharged as well from PT. Patient can only schedule appointments a few weeks at a time secondary for cx. Will plan to follow up next during next appointment.   Thank you,  Danielito Lyons PTA    Future Appointments   Date Time Provider Kaci Bose   2022  3:15 PM [facility]   10/3/2022  3:00 PM Angelique Mon MD PGUMAU [facility] AMB   2022  1:00 PM MD LISA DelgadoNI [facility] AMB   2023 11:30 AM MAT LAB PEÑA [facility] AMB   2023  2:00 PM Nitin Girard MD MAT [facility] AMB

## 2022-08-22 NOTE — PROGRESS NOTES
8/22/2022  Daniel Espino 67 y.o. male      Seen at the request of [unfilled]    Chief Complaint:  Chief Complaint   Patient presents with    Numbness    Extremity Weakness          HPI:   Here for nerve conduction study of lower limbs, additional time was spent for detailed history, discussion of findings and management. Numbing in feet x 10 years developed after the hip surgery. Developed weakness of both legs with difficulty of walking, waddling without pain began in April 2022, needs to hold on something to get balance, using a cane since May. Soreness in sciatic notch bilaterally as soon as standing up from sitting for a while began in March, much improved with PT. Had about 3 falls since problems started before using cane. Denied LBP. No diabetes. Drinks ETOH bid during pandemic years, now twice a week. Had COVID infection mild, last January. Medication list was reviewed, allopurinol, Cozaar, Synthroid and aspirin, no statin. IMAGING REVIEW: I have reviewed imaging study and discussed result with patient in detail. Labs reviewed, a1c TSH normal range. Cr 1.1    Past Medical History:  Past Medical History:   Diagnosis Date    Arrhythmia 2006    LAST ECHO 2006 ?  APCs    Elevated PSA     Dr Halima Velazquez     Gout     medication    Hematuria 8/4/2011    Pre op      History of hepatitis A 1980    Hypertension     controlled with medication    Malaria 1973    Mixed hyperlipidemia     ascvd risk 20.5 in 2016    Morbid obesity ([address]     Osteoarthritis of hip 4/6/2011    LEFT TKA    Pre-diabetes     Sleep apnea 05/14/2019    new diagnosis - not received cpap yet    Thyroid disease     hypothyroidism - medication       Past Surgical History:  Past Surgical History:   Procedure Laterality Date    ABLATION OF DYSRHYTHMIC FOCUS  2019    COLONOSCOPY  2012    HEENT      lens implants    [address]    left middle finger repair bone chip    PROSTATE BIOPSY, NEEDLE, SATURATION SAMPLING syncopal...the patient tested states not allergic to wasp 8/2/21       Review of Systems:  Review of Systems   Neurological:  Positive for sensory change and weakness. Extended / Orthostatic Vitals:      Vitals:    08/22/22 0855   BP: 120/78   Pulse: 80   Weight: 290 lb (131.5 kg)   Height: 5' 11\" (1.803 m)        Physical Exam  Vitals reviewed. Constitutional:       Appearance: He is obese. HENT:      Head: Normocephalic. Eyes:      Extraocular Movements: Extraocular movements intact and EOM normal.      Conjunctiva/sclera: Conjunctivae normal.      Pupils: Pupils are equal, round, and reactive to light. Cardiovascular:      Rate and Rhythm: Normal rate. Pulmonary:      Effort: Pulmonary effort is normal.   Musculoskeletal:         General: Normal range of motion. Cervical back: Normal range of motion. Skin:     General: Skin is warm and dry. Neurological:      Mental Status: He is alert and oriented to person, place, and time. Cranial Nerves: No cranial nerve deficit. Sensory: Sensory deficit present. Motor: Weakness present. Coordination: Coordination normal. Finger-Nose-Finger Test and Romberg Test normal.      Gait: Gait abnormal.      Deep Tendon Reflexes: Reflexes abnormal.      Reflex Scores:       Tricep reflexes are 1+ on the right side and 1+ on the left side. Bicep reflexes are 1+ on the right side and 1+ on the left side. Brachioradialis reflexes are 1+ on the right side and 1+ on the left side. Patellar reflexes are 0 on the right side and 0 on the left side. Achilles reflexes are 0 on the right side and 0 on the left side. Psychiatric:         Mood and Affect: Mood normal.         Speech: Speech normal.         Behavior: Behavior normal.         Thought Content: Thought content normal.         Judgment: Judgment normal.        Neurologic Exam     Mental Status   Oriented to person, place, and time.    Attention: normal. from ankle  Left leg vibration: decreased from ankle  Right arm pinprick: normal  Left arm pinprick: normal  Right leg pinprick: decreased from toes  Left leg pinprick: decreased from toes    Gait, Coordination, and Reflexes     Coordination   Romberg: negative  Finger to nose coordination: normal    Tremor   Resting tremor: absent  Intention tremor: absent  Action tremor: absent    Reflexes   Right brachioradialis: 1+  Left brachioradialis: 1+  Right biceps: 1+  Left biceps: 1+  Right triceps: 1+  Left triceps: 1+  Right patellar: 0  Left patellar: 0  Right achilles: 0  Left achilles: 0  Right plantar: normal  Left plantar: normal  Right Stuart: absent  Left Stuart: absent  Waddling gait with a cane           Assessment / Plan:    Davidson Chavarria was seen today for numbness and extremity weakness. Diagnoses and all orders for this visit:    Axonal polyneuropathy  -     Vitamin B12; Future  -     KUNAL and PE, Serum; Future    Numbness and tingling of both feet    Leg weakness, bilateral  -     CK; Future  -     Aldolase; Future  -     MRI LUMBAR SPINE WO CONTRAST; Future  -     [address] - Physical Therapy, [facility]    Myopathy     Patient has chronic neuropathy symptoms for about 10 years but developed a subacute bilateral lumbar radiculopathy and lumbar girdle myopathy in March April of this year, affecting his walking. Had mild COVID infection in January. Check lumbar spine MRI with and without contrast (CR 1.1), open MRI was requested by patient. Check muscle enzymes in 3 weeks (if increase will check anti-NT5 C1A for [employer]); if all negative may need Lp. I have spent 45 min, greater than 50% of discussing and counseling with patient, for treatment and diagnostic plan review.

## 2022-08-22 NOTE — PROGRESS NOTES
Viktor Bailey  : 1950  Primary: Chanelle Forrest Medicare  Secondary:  Herbie Gabriel  [address] [address]  Phone: [phone]  Fax: [phone] Plan Frequency: 2x/wk for 12 weeks    Plan of Care/Certification Expiration Date: 22      PT Visit Info: Total # of Visits to Date: 7  Progress Note Counter: 3      OUTPATIENT PHYSICAL THERAPY:OP NOTE TYPE: Treatment Note 2022       Episode   Appt Desk       Treatment Diagnosis:  Low Back Pain (M54.5)  Other intervertebral disc degeneration, lumbar region (M51.36)  Medical/Referring Diagnosis:  Other intervertebral disc degeneration, lumbar region [M51.36]  Referring Physician:  Kenny Brown MD MD Orders:  PT Eval and Treat   Date of Onset:  Onset Date: 22     Allergies: Other  Restrictions/Precautions:    No data recordedNo data recorded   Interventions Planned (Treatment may consist of any combination of the following):    Current Treatment Recommendations: Strengthening; ROM; Manual Therapy - Soft Tissue Mobilization; Manual Therapy - Joint Manipulation; Pain management; Home exercise program; Patient/Caregiver education & training; Modalities; Aquatics     Subjective Comments:     Initial:      /10 Post Session:      /10  Medications Last Reviewed:  2022  Updated Objective Findings:  None Today  Treatment   THERAPEUTIC EXERCISE: (40 minutes):    Exercises per grid below to improve mobility and strength. Required minimal verbal cues to promote proper body alignment and promote proper body mechanics. Progressed resistance and repetitions as indicated.      Date:  22   Activity/Exercise    SKTC With strap for assist   3 reps  15 sec holds  B LE's   Pelvic Tilts 15 reps  5 sec holds   Theraband Clam Shells in [employer] T-band  20 reps   NuStep Level 4  8 minutes   Supine Lumbar Rotation Stretch    Supine Marching 20 reps  B LE's   Hip Adduction with Ball    Gluteal Sets

## 2022-08-22 NOTE — PROGRESS NOTES
[address], [place] [address], 969 [facility]       Nerve Conduction Study and Electromyogram Report           Hx: 67 y.o. male with complaint of numbness and tingling of lower buttock region. Onset 03/2022 when at work stood up and  had LBP with posterior thigh issues. Pt has had P.T. since which has alleviated some of the sx.hg   Denies DM. Clinical summary was reviewed. Neurological examination: Motor power showed proximal weakness in the both lower extremities. There was no atrophy. There were no fasciculations. Deep tendon reflexes were hypoactive. Sensory deficit ankles below. Gait waddling with a cane. Description: Nerve conduction studies were performed on the right lower extremity and left lower extremity, using standard technique. Bilateral sural response was absent. Bilateral peroneal and tibial motor nerve showed reduced amplitudes and normal/mildly reduced to CV, latencies were normal.  F-wave latencies were mildly prolonged in the range 60.9-65.8, peroneal 77.2 MS. H reflex response was absent bilaterally. Needle electromyography was performed on the right lower extremity, left lower extremity, and right lumbar paraspinal muscles, left lumbar paraspinal muscles was not completed due to patient's poor tolerance, using standard concentric electrodes. Mild of denervation changes with reduced recruitment were recorded from bilateral lower leg muscles, right iliopsoas (left iliopsoas was not) showed significant degree of denervation changes and myopathic waveforms, less degree of denervation changes and reduced recruitment were found in the right gluteus medius (left side not done). Bilateral quadriceps was normal.  Right-sided lumbar paraspinal muscles showed  low degree of increased insertional activities and denervation changes. 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 No demyelinating process. 2) bilateral lower lumbar radiculopathy, mixed with active axonal loss from polyneuropathy. 4) myopathic process recorded from iliopsoas. Findings suggested (in combination with onset symptoms of pain) polyradiculopathy and myopathy. Etiology unknown at this time.              Procedure Details: Under procedure category          Liu Andujar MD

## 2022-08-22 NOTE — PROGRESS NOTES
[address] [address] [address] [facility]       Nerve Conduction Study and Electromyogram Report           Hx: 67 y.o. male with complaint of {neuro symp:39753}. Symptoms have been present for {0-10:26537} {units:11}  Significant past medical history includes {neuro associa systems:53714}. Clinical summary was reviewed. Neurological examination: Motor power showed {neuro weakness:96228}. There was {neuro atrophy:79319}. There were {neuro fascicul:61299}. Deep tendon reflexes were {neuro appear:53938}. ***        Description: Nerve conduction studies were performed on the {neuro ue/le:74787}, using standard technique. ***        Needle electromyography was performed on the {neuro ue/le:58106}, using standard concentric electrodes.          Conclusion: {neuro study:61665}          Procedure Details: Under procedure category          Connor White MD

## 2022-08-25 LAB
ALBUMIN SERPL ELPH-MCNC: 3.8 G/DL (ref 2.9–4.4)
ALBUMIN/GLOB SERPL: 1.6 {RATIO} (ref 0.7–1.7)
ALPHA1 GLOB SERPL ELPH-MCNC: 0.2 G/DL (ref 0–0.4)
ALPHA2 GLOB SERPL ELPH-MCNC: 0.7 G/DL (ref 0.4–1)
B-GLOBULIN SERPL ELPH-MCNC: 1 G/DL (ref 0.7–1.3)
GAMMA GLOB SERPL ELPH-MCNC: 0.6 G/DL (ref 0.4–1.8)
GLOBULIN SER-MCNC: 2.5 G/DL (ref 2.2–3.9)
IGA SERPL-MCNC: 86 MG/DL (ref 61–437)
IGG SERPL-MCNC: 739 MG/DL (ref 603–1613)
IGM SERPL-MCNC: 32 MG/DL (ref 15–143)
INTERPRETATION SERPL IEP-IMP: NORMAL
M PROTEIN SERPL ELPH-MCNC: NORMAL G/DL
PROT SERPL-MCNC: 6.3 G/DL (ref 6–8.5)

## 2022-08-29 ENCOUNTER — HOSPITAL ENCOUNTER (OUTPATIENT)
Dept: PHYSICAL THERAPY | Age: 72
Setting detail: RECURRING SERIES
End: 2022-08-29
Payer: MEDICARE

## 2022-08-29 NOTE — PROGRESS NOTES
Nuzhat Mar  : 1950  Primary: Carrie Steve Medicare  Secondary:  Edwin Celaya  [facility] 81  [address] [address]  Phone: [phone]  Fax: [phone]    PT Visit Info: Total # of Visits to Date: 7  Progress Note Counter: 3     OT Visit Info:  No data recorded    OUTPATIENT THERAPY:OP NOTE TYPE: Progress Report 2022               Episode  Appt Desk           Nuzhat Mar cancelled his appointment for today due to  has new order, will need to be with PT for evaluation . Will plan to follow up next during next appointment.   Thank you,  Rocio Mcclellan, PTA    Future Appointments   Date Time Provider Kaci Lidya   10/3/2022  3:00 PM Rolando Smyth MD [facility] AM[facility]   2023  1:00 PM Deyvi Browning MD [facility]   2023 11:30 AM MAT LAB MAT [facility] AM[facility]   2023  2:00 PM Geovany Gordon MD [facility] AM[facility]

## 2022-09-21 ENCOUNTER — HOSPITAL ENCOUNTER (OUTPATIENT)
Dept: PHYSICAL THERAPY | Age: 72
Setting detail: RECURRING SERIES
Discharge: HOME | End: 2022-09-24
Payer: MEDICARE

## 2022-09-21 DIAGNOSIS — R29.898 LEG WEAKNESS, BILATERAL: ICD-10-CM

## 2022-09-21 LAB — CK SERPL-CCNC: 129 U/L (ref 21–215)

## 2022-09-21 PROCEDURE — 97162 PT EVAL MOD COMPLEX 30 MIN: CPT

## 2022-09-21 ASSESSMENT — PAIN SCALES - GENERAL: PAINLEVEL_OUTOF10: 0

## 2022-09-21 NOTE — THERAPY EVALUATION
Heriberto Foote  : 1950  Primary: Elisabeth Thao Medicare  Secondary:  Sera Solano  [facility] 81  [address] [place] [address] Way [address]  Phone: [phone]  Fax: [phone] Plan Frequency: 1 time per week for 90 days    Plan of Care/Certification Expiration Date: 22      PT Visit Info: Total # of Visits to Date: 1  Progress Note Counter: 1  Progress Note Due Date: 10/21/22      OUTPATIENT PHYSICAL THERAPY:OP NOTE TYPE: Re-evaluation 2022               Episode  Appt Desk         Treatment Diagnosis:   Generalized Muscle Weakness (M62.81)  Difficulty in walking, Not elsewhere classified (R26.2)  History of falling (Z91.81)  Medical/Referring Diagnosis:  Other intervertebral disc degeneration, lumbar region [M51.36]  Referring Physician:  Khang Arvizu MD MD Orders:  PT Eval and Treat   Return MD Appt:  2022  Date of Onset:  Onset Date: 22     Allergies: Other  Restrictions/Precautions:    Restrictions/Precautions: None  Required Braces or Orthoses?: No   Medications Last Reviewed:  2022     SUBJECTIVE   History of Injury/Illness (Reason for Referral):  Patient is well known to PT from previous treatments. Patient reports that all of his pain and weakness started in 2022. He reports that is has gotten progressively worse. He reports that it started after he had been doing more sitting activities for work and he stood up and had pain in his lower back/hip/leg region. He reports that he had been using his rollator for ambulation, but lately has been trying to use a small-based quad cane. He reports he feels more stable with his rollator. He reports that he would like to continue with PT to improve his overall mobility, strength, and balance.     Initial:     010 Post Session:     0/10  Past Medical History/Comorbidities:   Mr. Ulices Penaloza  has a past medical history of Arrhythmia, Elevated PSA, Gout, Hematuria, History of hepatitis A, (independent)   Lateral Transfers: no assist (independent)                 Bed Mobility:       Rolling: no assist (independent)   Supine to Sit: no assist (independent)   Sit to Supine: no assist (independent)   Scooting: no assist (independent)                Dermatomes:   Sensation intact to light touch B LE's    Reflexes:   DTR's 1+ to bilateral patellar and achilles    Lumbar:   AROM:  Lumbar Flexion = 50 degrees (increased spasms in bilateral buttocks)  Lumbar Extension = 10 degrees  Lumbar Side Bending R = 23 degrees  Lumbar Side Bending L = 25 degrees    Strength:  R hip flexion = 4+/5  R hip abduction = 5/5  R hip adduction = 5/5  R knee extension = 5/5  R knee flexion = 5/5  R ankle dorsiflexion = 4-/5  R ankle plantarflexion = 5/5    L hip flexion = 4+/5  L hip abduction = 5/5  L hip adduction = 5/5  L knee extension = 5/5  L knee flexion = 5/5  L ankle dorsiflexion = 4-/5  L ankle plantarflexion = 5/5    Special Tests:  SLR: 40 degrees with marked hamstring tightness noted bilaterally    Balance / Vestibular:   Balance Outcome Measures:  Nicole Balance Scale:  38/56  (A score less than 45/56 indicates high risk of falls)    ASSESSMENT   Assessment:  Joss Lunsford presents with decreased mobility, decreased strength, and decreased safety with ambulation secondary to degenerative changes. After discussing with patient, he agreed he would benefit from physical therapy to improve above deficits. Please sign this plan of treatment if you concur. Thank you for the opportunity to serve this patient. Problem List: (Impacting functional limitations): Body Structures, Functions, Activity Limitations Requiring Skilled Therapeutic Intervention: Decreased functional mobility ; Decreased ROM; Decreased strength;  Increased pain     Therapy Prognosis:   Therapy Prognosis: Good     Assessment Complexity:   Medium Complexity  PLAN   Effective Dates: 9/21/2022 TO Plan of Care/Certification Expiration Date: 12/20/22     Frequency/Duration: Plan Frequency: 1 time per week for 90 days     Interventions Planned (Treatment may consist of any combination of the following):    Current Treatment Recommendations: Strengthening; ROM; Balance training; Functional mobility training; Neuromuscular re-education; Return to work related activity; Home exercise program     GOALS: (Goals have been discussed and agreed upon with patient.)  Short-Term Functional Goals: Time Frame: 30 days  Patient will be independent with home exercise program without exacerbation of symptoms or cueing needed. Discharge Goals: Time Frame: 90 days  Patient will be independent with all ADLs with minimal fear of falling and loss of balance with daily tasks. Patient will report no fear avoidance with social or recreational activities due to fear of falling. Patient will score greater than or equal to  45/56 on Nicole Balance Scale with minimal effect of imbalance or fatigue on patient's ability to manage every day life activities. Medical Necessity:   Patient is expected to demonstrate progress in strength, range of motion and functional technique to increase independence with daily activities. Patient demonstrates good rehab potential due to higher previous functional level. Reason For Services/Other Comments:  Patient continues to require skilled intervention due to decreased lumbar ROM, decreased LE strength/flexibility and increased pain leading to decreased functional status. Total Duration:  Time In: 1515  Time Out: 1600    Regarding Shahram Space therapy, I certify that the treatment plan above will be carried out by a therapist or under their direction.   Thank you for this referral,  Tristen Bahena PT     Referring Physician Signature: Debra Ruffin MD _______________________________ Date _____________        Post Session Pain  Charge Capture   POC Link  Treatment Note Link  MD Guidelines  MyChart

## 2022-09-21 NOTE — PROGRESS NOTES
Renzo Devlin  : 1950  Primary: Jessica Da Silva Medicare  Secondary:  Harini Toro  [address] [address]  Phone: [phone]  Fax: [phone] Plan Frequency: 1 time per week for 90 days    Plan of Care/Certification Expiration Date: 22      PT Visit Info: Total # of Visits to Date: 1  Progress Note Counter: 1  Progress Note Due Date: 10/21/22     Visit Count:  1   OUTPATIENT PHYSICAL THERAPY:OP NOTE TYPE: Treatment Note 2022       Episode  }Appt Desk             Treatment Diagnosis:    Generalized Muscle Weakness (M62.81)  Difficulty in walking, Not elsewhere classified (R26.2)  History of falling (Z91.81)  Medical/Referring Diagnosis:  Other intervertebral disc degeneration, lumbar region [M51.36]  Referring Physician:  Angelita Dawson MD MD Orders:  PT Eval and Treat   Date of Onset:  Onset Date: 22     Allergies: Other  Restrictions/Precautions:  Restrictions/Precautions: None  Required Braces or Orthoses?: No   Interventions Planned (Treatment may consist of any combination of the following):    Current Treatment Recommendations: Strengthening; ROM; Balance training; Functional mobility training; Neuromuscular re-education; Return to work related activity; Home exercise program     Subjective Comments:  Patient reports that he is doing well today  Initial:}    0/10Post Session:       0/10  Medications Last Reviewed:  2022  Updated Objective Findings:  See evaluation note from today  Treatment   THERAPEUTIC EXERCISE: (15 minutes):    Exercises per grid below to improve mobility, strength, balance, and coordination. Required minimal visual, verbal, and manual cues to promote proper body alignment, promote proper body posture, and promote proper body mechanics. Progressed resistance, range, repetitions, and complexity of movement as indicated.    Date:  2022   Activity/Exercise Parameters   Bridging 15 reps  HEP   Heel slides 15 reps  B LE  HEP   Straight leg raise 15 reps  B LE  HEP                      Treatment/Session Summary:    Treatment Assessment:  Patient tolerated assessment without complaints of increased pain, fatigue or imbalance  Communication/Consultation:  None today  Equipment provided today:  None  Recommendations/Intent for next treatment session: Next visit will focus on improving overall strength, balance, and mobility.     Total Treatment Billable Duration:  0 minutes + evaluation  Time In: 1581  Time Out: 1600    BRIANA CHI, PT       Charge Capture  }Post Session Pain  PT Visit Info  MedBridge Portal  MD Guidelines  Scanned Media  Benefits  MyChart    Future Appointments   Date Time Provider Kaci Bose   10/3/2022  3:00 PM Paul Sánchez MD [facility] AM[facility]   1/12/2023  1:00 PM Gustavo Galvan MD [facility]   1/16/2023 11:30 AM MAT LAB MAT [facility] AMB   1/23/2023  2:00 PM Mickey Argueta MD MAT [facility] AM[facility]

## 2022-09-22 LAB — ALDOLASE SERPL-CCNC: 4 U/L (ref 3.3–10.3)

## 2022-09-22 NOTE — THERAPY DISCHARGE
Cat Naik  : 1950  Primary: Austin Loredo Medicare  Secondary:  Yael Garrett  [address] [address]  Phone: [phone]  Fax: [phone] Plan Frequency: 1 time per week for 90 days    Plan of Care/Certification Expiration Date: 22      PT Visit Info: Total # of Visits to Date: 1  Progress Note Counter: 1  Progress Note Due Date: 10/21/22      OUTPATIENT PHYSICAL THERAPY:OP NOTE TYPE: Discharge Summary 2022               Episode  Appt Desk         Treatment Diagnosis:  Low Back Pain (M54.5)  Other intervertebral disc degeneration, lumbar region (M51.36)  * No diagnoses found *  Medical/Referring Diagnosis:  Other intervertebral disc degeneration, lumbar region [M51.36]  Referring Physician:  Nathaly Ac MD MD Orders:  PT Eval and Treat   Return MD Appt:  2022  Date of Onset:  Onset Date: 22     Allergies: Other  Restrictions/Precautions:    Restrictions/Precautions: None  Required Braces or Orthoses?: No  No data recorded   Medications Last Reviewed:  2022     SUBJECTIVE   History of Injury/Illness (Reason for Referral):  Pt states 9 weeks ago he was sitting at work and when he stood up he had pain in his R buttocks. He states when he walked the pain gradually subsided. He states he saw MD recently and she wanted him to have an MRI but insurance is wanting him to go through 6 weeks of PT first.  He started using a rolling walker May 10 due to difficulties walking due to the R buttock pain. He had x-rays which revealed lumbar disc degeneration. He carries his wallet in his R back pocket. Pt rates his current R buttock pain 1/10 sitting at rest, increasing to 6/10 at worst over the past week. Pt works a seated job-sits all day at work and takes minimal breaks. Pt denies any radiating pain or numbness/tingling into his LE's. Pt is not taking any medications for his R buttock pain.   Pt states he started bilateral piriformis and gluteals    ROM:  Lumbar Flexion = 50 degrees (increased spasms in bilateral buttocks)  Lumbar Extension = 10 degrees  Lumbar Side Bending R = 23 degrees  Lumbar Side Bending L = 25 degrees    Strength:  R hip flexion = 4+/5  R hip abduction = 5/5  R hip adduction = 5/5  R knee extension = 5/5  R knee flexion = 5/5  R ankle dorsiflexion = 4-/5  R ankle plantarflexion = 5/5    L hip flexion = 4+/5  L hip abduction = 5/5  L hip adduction = 5/5  L knee extension = 5/5  L knee flexion = 5/5  L ankle dorsiflexion = 4-/5  L ankle plantarflexion = 5/5    Special Tests:  SLR: 40 degrees with marked hamstring tightness noted bilaterally    ASSESSMENT   Initial Assessment:  Pt was last seen for PT on 08-08-22. He did not return for any further PT after that date. Discharge from PT. Problem List: (Impacting functional limitations): Body Structures, Functions, Activity Limitations Requiring Skilled Therapeutic Intervention: Decreased functional mobility ; Decreased ROM; Decreased strength; Increased pain     Therapy Prognosis:   Therapy Prognosis: Good     Assessment Complexity:   Medium Complexity  PLAN   Effective Dates: 05-26-22 TO Plan of Care/Certification Expiration Date: 12/20/22     Frequency/Duration: Plan Frequency: 1 time per week for 90 days     Interventions Planned (Treatment may consist of any combination of the following):    Current Treatment Recommendations: Strengthening; ROM; Balance training; Functional mobility training; Neuromuscular re-education;  Return to work related activity; Home exercise program     Goals: (Goals have been discussed and agreed upon with patient.)  Short-Term Functional Goals: Time Frame: 4 weeks  Pt will increase lumbar ROM flexion = 45 degrees to assist with daily activities (Goal Met)  Pt will increase SLR 50 degrees bilaterally to assist with daily activities  Pt will be independent with HEP (Goal Met)  Discharge Goals: Time Frame: 12 weeks  Pt will increase lumbar ROM flexion = 60 degrees to assist with daily activities  Pt will increase strength bilateral LE's 5/5 to assist with daily activities  Pt will perform 20 minutes household cleaning activities independently with min to no c/o back/buttock pain         Outcome Measure: Tool Used: Modified Oswestry Low Back Pain Questionnaire  Score:  Initial: 17/50  Most Recent: 13/50 (Date: 07-25-22)   Interpretation of Score: Each section is scored on a 0-5 scale, 5 representing the greatest disability. The scores of each section are added together for a total score of 50. Medical Necessity:   Patient is expected to demonstrate progress in strength, range of motion and functional technique to increase independence with daily activities. Patient demonstrates good rehab potential due to higher previous functional level. Reason For Services/Other Comments:  Patient continues to require skilled intervention due to decreased lumbar ROM, decreased LE strength/flexibility and increased pain leading to decreased functional status. Total Duration:  Time In: 1600  Time Out: 1645    Regarding Jocy Pounds therapy, I certify that the treatment plan above will be carried out by a therapist or under their direction.   Thank you for this referral,  Kalyani Veras, PT     Referring Physician Signature: Jerome Chaney MD _______________________________ Date _____________        Post Session Pain  Charge Capture   POC Link  Treatment Note Link  MD Guidelines  [facility]

## 2022-10-03 ENCOUNTER — OFFICE VISIT (OUTPATIENT)
Dept: UROLOGY | Age: 72
End: 2022-10-03
Payer: MEDICARE

## 2022-10-03 DIAGNOSIS — N40.0 BPH WITHOUT OBSTRUCTION/LOWER URINARY TRACT SYMPTOMS: Primary | ICD-10-CM

## 2022-10-03 PROCEDURE — 1123F ACP DISCUSS/DSCN MKR DOCD: CPT | Performed by: UROLOGY

## 2022-10-03 PROCEDURE — 99204 OFFICE O/P NEW MOD 45 MIN: CPT | Performed by: UROLOGY

## 2022-10-03 ASSESSMENT — ENCOUNTER SYMPTOMS
RESPIRATORY NEGATIVE: 1
EYES NEGATIVE: 1

## 2022-10-03 NOTE — PROGRESS NOTES
[facility]  [address] [facility], [address]  [phone]          Henrietta Laguna  : 1950    Chief Complaint   Patient presents with    Elevated PSA          HPI     Henrietta Laguna is a 67 y.o. male    PSA 5.2022  PSA 6.4 2020  PSA 3.2015  PSA 4.8 May 2013  PSA 6.21 in 2012    The patient is having minimal if any voiding symptoms. There is no family history of prostate cancer. His PSA was actually higher in 2012 than it was in 2022. Past Medical History:   Diagnosis Date    Arrhythmia 2006    LAST ECHO 2006 ? APCs    Elevated PSA     Dr Juan Serra     Gout     medication    Hematuria 2011    Pre op      History of hepatitis A     Hypertension     controlled with medication    Malaria     Mixed hyperlipidemia     ascvd risk 20.5 in 2016    Morbid obesity ([address]     Osteoarthritis of hip 2011    LEFT TKA    Pre-diabetes     Sleep apnea 2019    new diagnosis - not received cpap yet    Thyroid disease     hypothyroidism - medication     Past Surgical History:   Procedure Laterality Date    ABLATION OF DYSRHYTHMIC FOCUS      COLONOSCOPY      HEENT      lens implants     [employer]    left middle finger repair bone chip    PROSTATE BIOPSY, NEEDLE, SATURATION SAMPLING      TONSILLECTOMY      TOTAL HIP ARTHROPLASTY  2011    LEFT and right     Current Outpatient Medications   Medication Sig Dispense Refill    levothyroxine (SYNTHROID) 200 MCG tablet Take 1 tablet by mouth every morning (before breakfast) 90 tablet 1    losartan (COZAAR) 100 MG tablet Take 1 tablet by mouth in the morning. 90 tablet 1    allopurinol (ZYLOPRIM) 300 MG tablet Take 1 tablet by mouth in the morning. 90 tablet 1    aspirin 81 MG EC tablet Take by mouth daily       No current facility-administered medications for this visit. Allergies   Allergen Reactions    Other Other (See Comments)     Wasp.  Dizziness; syncopal...the The patient's PSA is actually come down a little bit over the last 2 years and is still within the normal range of 6.5 or less for a man 79 or older. I do not think a biopsy is indicated at this point. He will follow-up with me in 1 year. He tells me that he anticipates that his primary care physician will get a PSA next summer.

## 2022-10-04 ENCOUNTER — HOSPITAL ENCOUNTER (OUTPATIENT)
Dept: PHYSICAL THERAPY | Age: 72
Setting detail: RECURRING SERIES
Discharge: HOME | End: 2022-10-07
Payer: MEDICARE

## 2022-10-04 PROCEDURE — 97110 THERAPEUTIC EXERCISES: CPT

## 2022-10-04 ASSESSMENT — PAIN SCALES - GENERAL: PAINLEVEL_OUTOF10: 0

## 2022-10-04 NOTE — PROGRESS NOTES
Richar Kumar  : 1950  Primary: Geetha Meraz Medicare  Secondary:  Casie Dean 81  [address] [place] [address] Way [address]  Phone: [phone]  Fax: [phone] Plan Frequency: 1 time per week for 90 days    Plan of Care/Certification Expiration Date: 22      PT Visit Info: Total # of Visits Approved: 6  Total # of Visits to Date: 2  Progress Note Counter: 2  Progress Note Due Date: 10/21/22     Visit Count:  2   OUTPATIENT PHYSICAL THERAPY:OP NOTE TYPE: Treatment Note 10/4/2022       Episode  }Appt Desk             Treatment Diagnosis:    Generalized Muscle Weakness (M62.81)  Difficulty in walking, Not elsewhere classified (R26.2)  History of falling (Z91.81)  Medical/Referring Diagnosis:  Other intervertebral disc degeneration, lumbar region [M51.36]  Referring Physician:  Scottie Morales MD MD Orders:  PT Eval and Treat   Date of Onset:  Onset Date: 22     Allergies: Other  Restrictions/Precautions:  Restrictions/Precautions: None  Required Braces or Orthoses?: No     Interventions Planned (Treatment may consist of any combination of the following):    Current Treatment Recommendations: Strengthening; ROM; Balance training; Functional mobility training; Neuromuscular re-education; Return to work related activity; Home exercise program     Subjective Comments:  Patient reports no pain. Initial:}    0/10Post Session:       0/10  Medications Last Reviewed:  10/4/2022  Updated Objective Findings:  None Today  Treatment   THERAPEUTIC EXERCISE: (40 minutes):    Exercises per grid below to improve mobility, strength, balance, and coordination. Required minimal visual, verbal, and manual cues to promote proper body alignment, promote proper body posture, and promote proper body mechanics. Progressed resistance, range, repetitions, and complexity of movement as indicated.    Date:  10/4/2022   Activity/Exercise Parameters   Bridging 15 reps     Supine hip abduction Green theraband  15 reps   Supine hip adduction Red ball   15 reps   Nustep Level 3 x 10 minutes   Step ups 6 inch  2x10 reps   Standing exercises- hip flexion, hip abduction, hip extension, hamstring curls 10 reps bilateral   Seated ankle dorsiflexion/ankle plantarflexion 10 reps bilateral   Seated LAQS 10 reps bilateral   Sit to stand from hilo mat 10 reps      Treatment/Session Summary:    Treatment Assessment:  Patient tolerated treatment without complaints. Communication/Consultation:  None today  Equipment provided today:  None  Recommendations/Intent for next treatment session: Next visit will focus on improving overall strength, balance, and mobility.     Total Treatment Billable Duration:  40 minutes   Time In: 3845  Time Out: 1600    RANDI Monterroso, PT       Charge Capture  }Post Session Pain  PT Visit Info  MedBridge Portal  MD Guidelines  Scanned Media  Benefits  MyChart    Future Appointments   Date Time Provider Kaci Bose   10/11/2022  3:15 PM Cristofer Tyler, PT [facility]   10/18/2022  3:15 PM Cristofer Tyler, PT [facility]   10/25/2022  3:15 PM Cristofer Tyler, PT [facility]   11/1/2022  3:15 PM Cristofer Tyler, PT [facility]   11/8/2022  3:15 PM Chandni Euceda, PT [facility]   1/12/2023  1:00 PM MD MITCHELL Rogers [facility]   1/16/2023 11:30 AM MAT LAB PEÑA [facility] AMB   1/23/2023  2:00 PM MD PEÑA Corbett [facility] AMB

## 2022-10-11 ENCOUNTER — HOSPITAL ENCOUNTER (OUTPATIENT)
Dept: PHYSICAL THERAPY | Age: 72
Setting detail: RECURRING SERIES
Discharge: HOME | End: 2022-10-14
Payer: MEDICARE

## 2022-10-11 PROCEDURE — 97110 THERAPEUTIC EXERCISES: CPT

## 2022-10-11 ASSESSMENT — PAIN SCALES - GENERAL: PAINLEVEL_OUTOF10: 0

## 2022-10-11 NOTE — PROGRESS NOTES
Barry Cook  : 1950  Primary: [employer]  Secondary:  Lacie Dean 80  [address] [address]  Phone: [phone]  Fax: [phone] Plan Frequency: 1 time per week for 90 days    Plan of Care/Certification Expiration Date: 22      PT Visit Info: Total # of Visits Approved: 6  Total # of Visits to Date: 3  Progress Note Counter: 3  Progress Note Due Date: 10/21/22     Visit Count:  3   OUTPATIENT PHYSICAL THERAPY:OP NOTE TYPE: Treatment Note 10/11/2022       Episode  }Appt Desk             Treatment Diagnosis:    Generalized Muscle Weakness (M62.81)  Difficulty in walking, Not elsewhere classified (R26.2)  History of falling (Z91.81)  Medical/Referring Diagnosis:  Other intervertebral disc degeneration, lumbar region [M51.36]  Referring Physician:  Ling Rich MD MD Orders:  PT Eval and Treat   Date of Onset:  Onset Date: 22     Allergies: Other  Restrictions/Precautions:  Restrictions/Precautions: None  Required Braces or Orthoses?: No     Interventions Planned (Treatment may consist of any combination of the following):    Current Treatment Recommendations: Strengthening; ROM; Balance training; Functional mobility training; Neuromuscular re-education; Return to work related activity; Home exercise program     Subjective Comments:  Patient reports he is doing okay. Initial:}    0/10Post Session:       0/10  Medications Last Reviewed:  10/11/2022  Updated Objective Findings:  None Today  Treatment   THERAPEUTIC EXERCISE: (40 minutes):    Exercises per grid below to improve mobility, strength, balance, and coordination. Required minimal visual, verbal, and manual cues to promote proper body alignment, promote proper body posture, and promote proper body mechanics. Progressed resistance, range, repetitions, and complexity of movement as indicated.    Date:  10/11/2022   Activity/Exercise Parameters   Shoulder retraction Blue theraband 15 reps   Shoulder extension  Blue theraband 15 reps   Stepping over half foam Forward 10 reps bilateral   Nustep Level 3 x 10 minutes   Step ups 6 inch  2x10 reps   Standing exercises- hip flexion, hip abduction, hip extension, hamstring curls 15 reps bilateral   Seated ankle dorsiflexion/ankle plantarflexion 15 reps bilateral   Seated LAQS 15 reps bilateral   Sit to stand from hilo mat 20 reps      Treatment/Session Summary:    Treatment Assessment:  Patient progressing well with exercises. Communication/Consultation:  None today  Equipment provided today:  None  Recommendations/Intent for next treatment session: Next visit will focus on improving overall strength, balance, and mobility.     Total Treatment Billable Duration:  40 minutes   Time In: 8646  Time Out: 3387    Joseline Osman, PT       Charge Capture  }Post Session Pain  PT Visit Info  AddThis Portal  MD Guidelines  Scanned Media  Benefits  MyChart    Future Appointments   Date Time Provider Kaci Bose   10/18/2022  3:15 PM Roselyn Martinez, PT [facility]   10/25/2022  3:15 PM Roselyn Martinez, PT [facility]   11/1/2022  3:15 PM Roselyn Martinez, PT [facility]   11/8/2022  3:15 PM Paulino Euceda, PT [facility]   1/12/2023  1:00 PM MD MITCHELL Horton [facility]   1/16/2023 11:30 AM [facility]   1/23/2023  2:00 PM Norma Clarke MD [facility]

## 2022-10-18 ENCOUNTER — APPOINTMENT (OUTPATIENT)
Dept: PHYSICAL THERAPY | Age: 72
End: 2022-10-18
Payer: MEDICARE

## 2022-10-25 ENCOUNTER — APPOINTMENT (OUTPATIENT)
Dept: PHYSICAL THERAPY | Age: 72
End: 2022-10-25
Payer: MEDICARE

## 2023-01-03 ENCOUNTER — TELEPHONE (OUTPATIENT)
Dept: INTERNAL MEDICINE CLINIC | Facility: CLINIC | Age: 73
End: 2023-01-03

## 2023-01-03 NOTE — TELEPHONE ENCOUNTER
Pt called experiencing a lingering cough and fever. Pt informed that the first available appt would be 1/6/23. Advised to go to urgent care.

## 2023-01-16 DIAGNOSIS — R97.20 ELEVATED PSA: ICD-10-CM

## 2023-01-16 DIAGNOSIS — R73.03 PREDIABETES: ICD-10-CM

## 2023-01-16 DIAGNOSIS — E78.2 MIXED HYPERLIPIDEMIA: ICD-10-CM

## 2023-01-16 DIAGNOSIS — E03.4 HYPOTHYROIDISM DUE TO ACQUIRED ATROPHY OF THYROID: ICD-10-CM

## 2023-01-16 DIAGNOSIS — M1A.09X0 IDIOPATHIC CHRONIC GOUT OF MULTIPLE SITES WITHOUT TOPHUS: ICD-10-CM

## 2023-01-17 LAB
ALBUMIN SERPL-MCNC: 3.6 G/DL (ref 3.2–4.6)
ALBUMIN/GLOB SERPL: 1.2 (ref 0.4–1.6)
ALP SERPL-CCNC: 67 U/L (ref 50–136)
ALT SERPL-CCNC: 24 U/L (ref 12–65)
ANION GAP SERPL CALC-SCNC: 8 MMOL/L (ref 2–11)
AST SERPL-CCNC: 14 U/L (ref 15–37)
BASOPHILS # BLD: 0 K/UL (ref 0–0.2)
BASOPHILS NFR BLD: 1 % (ref 0–2)
BILIRUB SERPL-MCNC: 2.4 MG/DL (ref 0.2–1.1)
BUN SERPL-MCNC: 14 MG/DL (ref 8–23)
CALCIUM SERPL-MCNC: 9 MG/DL (ref 8.3–10.4)
CHLORIDE SERPL-SCNC: 112 MMOL/L (ref 101–110)
CHOLEST SERPL-MCNC: 175 MG/DL
CO2 SERPL-SCNC: 25 MMOL/L (ref 21–32)
CREAT SERPL-MCNC: 0.8 MG/DL (ref 0.8–1.5)
DIFFERENTIAL METHOD BLD: NORMAL
EOSINOPHIL # BLD: 0.1 K/UL (ref 0–0.8)
EOSINOPHIL NFR BLD: 1 % (ref 0.5–7.8)
ERYTHROCYTE [DISTWIDTH] IN BLOOD BY AUTOMATED COUNT: 13.2 % (ref 11.9–14.6)
EST. AVERAGE GLUCOSE BLD GHB EST-MCNC: 114 MG/DL
GLOBULIN SER CALC-MCNC: 3 G/DL (ref 2.8–4.5)
GLUCOSE SERPL-MCNC: 106 MG/DL (ref 65–100)
HBA1C MFR BLD: 5.6 % (ref 4.8–5.6)
HCT VFR BLD AUTO: 43.8 % (ref 41.1–50.3)
HDLC SERPL-MCNC: 43 MG/DL (ref 40–60)
HDLC SERPL: 4.1
HGB BLD-MCNC: 14.3 G/DL (ref 13.6–17.2)
IMM GRANULOCYTES # BLD AUTO: 0.1 K/UL (ref 0–0.5)
IMM GRANULOCYTES NFR BLD AUTO: 1 % (ref 0–5)
LDLC SERPL CALC-MCNC: 111.2 MG/DL
LYMPHOCYTES # BLD: 1.1 K/UL (ref 0.5–4.6)
LYMPHOCYTES NFR BLD: 13 % (ref 13–44)
MCH RBC QN AUTO: 30.5 PG (ref 26.1–32.9)
MCHC RBC AUTO-ENTMCNC: 32.6 G/DL (ref 31.4–35)
MCV RBC AUTO: 93.4 FL (ref 82–102)
MONOCYTES # BLD: 0.6 K/UL (ref 0.1–1.3)
MONOCYTES NFR BLD: 7 % (ref 4–12)
NEUTS SEG # BLD: 6.5 K/UL (ref 1.7–8.2)
NEUTS SEG NFR BLD: 77 % (ref 43–78)
NRBC # BLD: 0 K/UL (ref 0–0.2)
PLATELET # BLD AUTO: 202 K/UL (ref 150–450)
PMV BLD AUTO: 10.1 FL (ref 9.4–12.3)
POTASSIUM SERPL-SCNC: 3.9 MMOL/L (ref 3.5–5.1)
PROT SERPL-MCNC: 6.6 G/DL (ref 6.3–8.2)
RBC # BLD AUTO: 4.69 M/UL (ref 4.23–5.6)
SODIUM SERPL-SCNC: 145 MMOL/L (ref 133–143)
T4 FREE SERPL-MCNC: 1.4 NG/DL (ref 0.78–1.46)
TRIGL SERPL-MCNC: 104 MG/DL (ref 35–150)
TSH, 3RD GENERATION: 0.94 UIU/ML (ref 0.36–3.74)
URATE SERPL-MCNC: 5 MG/DL (ref 2.6–6)
VLDLC SERPL CALC-MCNC: 20.8 MG/DL (ref 6–23)
WBC # BLD AUTO: 8.4 K/UL (ref 4.3–11.1)

## 2023-01-18 LAB
PSA FREE MFR SERPL: 19.2 %
PSA FREE SERPL-MCNC: 1 NG/ML
PSA SERPL-MCNC: 5.2 NG/ML

## 2023-01-19 ENCOUNTER — CLINICAL DOCUMENTATION (OUTPATIENT)
Dept: PHYSICAL THERAPY | Age: 73
End: 2023-01-19

## 2023-01-19 NOTE — THERAPY DISCHARGE
Andi Valle [facility] [address]  [address] [address]  Phone: [phone]  Fax: [phone]    OUTPATIENT PHYSICAL THERAPY  Discharge Summary 1/19/2023  Episode  Appt Desk         Hennie Ganser has been seen in physical therapy for 3  visits from 9/21/2022 to 10/11/2022, with 0 cancellations and 0 no shows. Mr. Caitlyn Carlos therapy has come to an end at this time due to: Patient did not return for/schedule additional treatment    Physical Therapy Goals:  Not Met: Reasons for goals not being achieved: Patient not returning for any additional visit.     Lenin Maier, PT

## 2023-01-23 ENCOUNTER — OFFICE VISIT (OUTPATIENT)
Dept: INTERNAL MEDICINE CLINIC | Facility: CLINIC | Age: 73
End: 2023-01-23
Payer: COMMERCIAL

## 2023-01-23 VITALS
BODY MASS INDEX: 40.6 KG/M2 | TEMPERATURE: 97.9 F | HEART RATE: 67 BPM | WEIGHT: 290 LBS | RESPIRATION RATE: 16 BRPM | HEIGHT: 71 IN | OXYGEN SATURATION: 100 % | SYSTOLIC BLOOD PRESSURE: 131 MMHG | DIASTOLIC BLOOD PRESSURE: 70 MMHG

## 2023-01-23 DIAGNOSIS — G62.9 AXONAL POLYNEUROPATHY: ICD-10-CM

## 2023-01-23 DIAGNOSIS — Z13.5 GLAUCOMA SCREENING: ICD-10-CM

## 2023-01-23 DIAGNOSIS — I48.3 TYPICAL ATRIAL FLUTTER (HCC): ICD-10-CM

## 2023-01-23 DIAGNOSIS — E66.01 MORBID OBESITY WITH BODY MASS INDEX (BMI) OF 40.0 TO 44.9 IN ADULT (HCC): ICD-10-CM

## 2023-01-23 DIAGNOSIS — N40.0 BENIGN PROSTATIC HYPERPLASIA WITHOUT LOWER URINARY TRACT SYMPTOMS: ICD-10-CM

## 2023-01-23 DIAGNOSIS — G47.33 OSA (OBSTRUCTIVE SLEEP APNEA): ICD-10-CM

## 2023-01-23 DIAGNOSIS — Z12.11 SCREEN FOR COLON CANCER: ICD-10-CM

## 2023-01-23 DIAGNOSIS — I10 ESSENTIAL HYPERTENSION: ICD-10-CM

## 2023-01-23 DIAGNOSIS — R73.03 PREDIABETES: ICD-10-CM

## 2023-01-23 DIAGNOSIS — E78.2 MIXED HYPERLIPIDEMIA: ICD-10-CM

## 2023-01-23 DIAGNOSIS — Z00.00 MEDICARE ANNUAL WELLNESS VISIT, SUBSEQUENT: Primary | ICD-10-CM

## 2023-01-23 DIAGNOSIS — M1A.09X0 IDIOPATHIC CHRONIC GOUT OF MULTIPLE SITES WITHOUT TOPHUS: ICD-10-CM

## 2023-01-23 DIAGNOSIS — E03.4 HYPOTHYROIDISM DUE TO ACQUIRED ATROPHY OF THYROID: ICD-10-CM

## 2023-01-23 PROCEDURE — 3075F SYST BP GE 130 - 139MM HG: CPT | Performed by: INTERNAL MEDICINE

## 2023-01-23 PROCEDURE — 99214 OFFICE O/P EST MOD 30 MIN: CPT | Performed by: INTERNAL MEDICINE

## 2023-01-23 PROCEDURE — G0439 PPPS, SUBSEQ VISIT: HCPCS | Performed by: INTERNAL MEDICINE

## 2023-01-23 PROCEDURE — 1123F ACP DISCUSS/DSCN MKR DOCD: CPT | Performed by: INTERNAL MEDICINE

## 2023-01-23 PROCEDURE — 3078F DIAST BP <80 MM HG: CPT | Performed by: INTERNAL MEDICINE

## 2023-01-23 RX ORDER — LOSARTAN POTASSIUM 100 MG/1
100 TABLET ORAL DAILY
Qty: 90 TABLET | Refills: 1 | Status: SHIPPED | OUTPATIENT
Start: 2023-01-23

## 2023-01-23 RX ORDER — LEVOTHYROXINE SODIUM 0.2 MG/1
200 TABLET ORAL
Qty: 90 TABLET | Refills: 1 | Status: SHIPPED | OUTPATIENT
Start: 2023-01-23

## 2023-01-23 RX ORDER — ALLOPURINOL 300 MG/1
300 TABLET ORAL DAILY
Qty: 90 TABLET | Refills: 1 | Status: SHIPPED | OUTPATIENT
Start: 2023-01-23

## 2023-01-23 ASSESSMENT — PATIENT HEALTH QUESTIONNAIRE - PHQ9
SUM OF ALL RESPONSES TO PHQ9 QUESTIONS 1 & 2: 0
SUM OF ALL RESPONSES TO PHQ QUESTIONS 1-9: 0
2. FEELING DOWN, DEPRESSED OR HOPELESS: 0
SUM OF ALL RESPONSES TO PHQ QUESTIONS 1-9: 0
1. LITTLE INTEREST OR PLEASURE IN DOING THINGS: 0

## 2023-01-23 ASSESSMENT — ENCOUNTER SYMPTOMS
SHORTNESS OF BREATH: 0
ABDOMINAL PAIN: 0
COUGH: 0
BLOOD IN STOOL: 0
RHINORRHEA: 0

## 2023-01-23 ASSESSMENT — LIFESTYLE VARIABLES
HOW MANY STANDARD DRINKS CONTAINING ALCOHOL DO YOU HAVE ON A TYPICAL DAY: 1 OR 2
HOW OFTEN DO YOU HAVE A DRINK CONTAINING ALCOHOL: 2-4 TIMES A MONTH

## 2023-01-23 NOTE — PATIENT INSTRUCTIONS
Preventing Falls: Care Instructions  Overview     Getting around your home safely can be a challenge if you have injuries or health problems that make it easy for you to fall. Loose rugs and furniture in walkways are among the dangers for many older people who have problems walking or who have poor eyesight. People who have conditions such as arthritis, osteoporosis, or dementia also have to be careful not to fall. You can make your home safer with a few simple measures. Follow-up care is a key part of your treatment and safety. Be sure to make and go to all appointments, and call your doctor if you are having problems. It's also a good idea to know your test results and keep a list of the medicines you take. How can you care for yourself at home? Taking care of yourself  Exercise regularly to improve your strength, muscle tone, and balance. Walk if you can. Swimming may be a good choice if you cannot walk easily. Have your vision and hearing checked each year or any time you notice a change. If you have trouble seeing and hearing, you might not be able to avoid objects and could lose your balance. Know the side effects of the medicines you take. Ask your doctor or pharmacist whether the medicines you take can affect your balance. Sleeping pills or sedatives can affect your balance. Limit the amount of alcohol you drink. Alcohol can impair your balance and other senses. Ask your doctor whether calluses or corns on your feet need to be removed. If you wear loose-fitting shoes because of calluses or corns, you can lose your balance and fall. Talk to your doctor if you have numbness in your feet. You may get dizzy if you do not drink enough water. To prevent dehydration, drink plenty of fluids. Choose water and other clear liquids. If you have kidney, heart, or liver disease and have to limit fluids, talk with your doctor before you increase the amount of fluids you drink.   Preventing falls at home  Remove raised doorway thresholds, throw rugs, and clutter. Repair loose carpet or raised areas in the floor. Move furniture and electrical cords to keep them out of walking paths. Use nonskid floor wax, and wipe up spills right away, especially on ceramic tile floors. If you use a walker or cane, put rubber tips on it. If you use crutches, clean the bottoms of them regularly with an abrasive pad, such as steel wool. Keep your house well lit, especially stairways, porches, and outside walkways. Use night-lights in areas such as hallways and bathrooms. Add extra light switches or use remote switches (such as switches that go on or off when you clap your hands) to make it easier to turn lights on if you have to get up during the night. Install sturdy handrails on stairways. Move items in your cabinets so that the things you use a lot are on the lower shelves (about waist level). Keep a cordless phone and a flashlight with new batteries by your bed. If possible, put a phone in each of the main rooms of your house, or carry a cell phone in case you fall and cannot reach a phone. Or, you can wear a device around your neck or wrist. You push a button that sends a signal for help. Wear low-heeled shoes that fit well and give your feet good support. Use footwear with nonskid soles. Check the heels and soles of your shoes for wear. Repair or replace worn heels or soles. Do not wear socks without shoes on smooth floors, such as wood. Walk on the grass when the sidewalks are slippery. If you live in an area that gets snow and ice in the winter, sprinkle salt on slippery steps and sidewalks. Or ask a family member or friend to do this for you. Preventing falls in the bath  Install grab bars and nonskid mats inside and outside your shower or tub and near the toilet and sinks. Use shower chairs and bath benches. Use a hand-held shower head that will allow you to sit while showering.   Get into a tub or shower by putting the weaker leg in first. Get out of a tub or shower with your strong side first.  Repair loose toilet seats and consider installing a raised toilet seat to make getting on and off the toilet easier. Keep your bathroom door unlocked while you are in the shower. Where can you learn more? Go to http://www.york.com/ and enter G117 to learn more about \"Preventing Falls: Care Instructions. \"  Current as of: May 4, 2022               Content Version: 13.5  © 9262-8145 Healthwise, [employer]. Care instructions adapted under license by [facility] ([facility]). If you have questions about a medical condition or this instruction, always ask your healthcare professional. [address] any warranty or liability for your use of this information. Learning About Being Active as an Older Adult  Why is being active important as you get older? Being active is one of the best things you can do for your health. And it's never too late to start. Being active--or getting active, if you aren't already--has definite benefits. It can:  Give you more energy,  Keep your mind sharp. Improve balance to reduce your risk of falls. Help you manage chronic illness with fewer medicines. No matter how old you are, how fit you are, or what health problems you have, there is a form of activity that will work for you. And the more physical activity you can do, the better your overall health will be. What kinds of activity can help you stay healthy? Being more active will make your daily activities easier. Physical activity includes planned exercise and things you do in daily life. There are four types of activity:  Aerobic. Doing aerobic activity makes your heart and lungs strong. Includes walking, dancing, and gardening. Aim for at least 2½ hours spread throughout the week. It improves your energy and can help you sleep better. Muscle-strengthening.   This type of activity can help maintain muscle and strengthen bones. Includes climbing stairs, using resistance bands, and lifting or carrying heavy loads. Aim for at least twice a week. It can help protect the knees and other joints. Stretching. Stretching gives you better range of motion in joints and muscles. Includes upper arm stretches, calf stretches, and gentle yoga. Aim for at least twice a week, preferably after your muscles are warmed up from other activities. It can help you function better in daily life. Balancing. This helps you stay coordinated and have good posture. Includes heel-to-toe walking, min chi, and certain types of yoga. Aim for at least 3 days a week. It can reduce your risk of falling. Even if you have a hard time meeting the recommendations, it's better to be more active than less active. All activity done in each category counts toward your weekly total. You'd be surprised how daily things like carrying groceries, keeping up with grandchildren, and taking the stairs can add up. What keeps you from being active? If you've had a hard time being more active, you're not alone. Maybe you remember being able to do more. Or maybe you've never thought of yourself as being active. It's frustrating when you can't do the things you want. Being more active can help. What's holding you back? Getting started. Have a goal, but break it into easy tasks. Small steps build into big accomplishments. Staying motivated. If you feel like skipping your activity, remember your goal. Maybe you want to move better and stay independent. Every activity gets you one step closer. Not feeling your best.  Start with 5 minutes of an activity you enjoy. Prove to yourself you can do it. As you get comfortable, increase your time. You may not be where you want to be. But you're in the process of getting there. Everyone starts somewhere. How can you find safe ways to stay active?   Talk with your doctor about any physical challenges you're facing. Make a plan with your doctor if you have a health problem or aren't sure how to get started with activity. If you're already active, ask your doctor if there is anything you should change to stay safe as your body and health change. If you tend to feel dizzy after you take medicine, avoid activity at that time. Try being active before you take your medicine. This will reduce your risk of falls. If you plan to be active at home, make sure to clear your space before you get started. Remove things like TV cords, coffee tables, and throw rugs. It's safest to have plenty of space to move freely. The key to getting more active is to take it slow and steady. Try to improve only a little bit at a time. Pick just one area to improve on at first. And if an activity hurts, stop and talk to your doctor. Where can you learn more? Go to http://Formula [employer]O.york.com/ and enter P600 to learn more about \"Learning About Being Active as an Older Adult. \"  Current as of: October 10, 2022               Content Version: 13.5  © 7881-6997 Healthwise, [employer]. Care instructions adapted under license by [facility] ([facility]). If you have questions about a medical condition or this instruction, always ask your healthcare professional. [address] any warranty or liability for your use of this information. Hearing Loss: Care Instructions  Overview     Hearing loss is a sudden or slow decrease in how well you hear. It can range from mild to severe. Permanent hearing loss can occur with aging. It also can happen when you are exposed long-term to loud noise. Examples include listening to loud music, riding motorcycles, or being around other loud machines. Hearing loss can affect your work and home life. It can make you feel lonely or depressed. You may feel that you have lost your independence. But hearing aids and other devices can help you hear better and feel connected to others.   Follow-up care is a key part of your treatment and safety. Be sure to make and go to all appointments, and call your doctor if you are having problems. It's also a good idea to know your test results and keep a list of the medicines you take. How can you care for yourself at home? Avoid loud noises whenever possible. This helps keep your hearing from getting worse. Always wear hearing protection around loud noises. Wear a hearing aid as directed. See a professional who can help you pick a hearing aid that fits you. Have hearing tests as your doctor suggests. They can show whether your hearing has changed. Your hearing aid may need to be adjusted. Use other devices as needed. These may include:  Telephone amplifiers and hearing aids that can connect to a television, stereo, radio, or microphone. Devices that use lights or vibrations. These alert you to the doorbell, a ringing telephone, or a baby monitor. Television closed-captioning. This shows the words at the bottom of the screen. Most new TVs can do this. TTY (text telephone). This lets you type messages back and forth on the telephone instead of talking or listening. These devices are also called TDD. When messages are typed on the keyboard, they are sent over the phone line to a receiving TTY. The message is shown on a monitor. Use text messaging, social media, and email if it is hard for you to communicate by telephone. Try to learn a listening technique called speechreading. It is not lipreading. You pay attention to people's gestures, expressions, posture, and tone of voice. These clues can help you understand what a person is saying. Face the person you are talking to, and have them face you. Make sure the lighting is good. You need to see the other person's face clearly. Think about counseling if you need help to adjust to your hearing loss. When should you call for help?   Watch closely for changes in your health, and be sure to contact your doctor if:    You think your hearing is getting worse.     You have new symptoms, such as dizziness or nausea. Where can you learn more? Go to http://www.york.com/ and enter R798 to learn more about \"Hearing Loss: Care Instructions. \"  Current as of: May 4, 2022               Content Version: 13.5  © 2006-2022 [employer]. Care instructions adapted under license by [facility] ([facility]). If you have questions about a medical condition or this instruction, always ask your healthcare professional. [address] any warranty or liability for your use of this information. Learning About Vision Tests  What are vision tests? The four most common vision tests are visual acuity tests, refraction, visual field tests, and color vision tests. Visual acuity (sharpness) tests  These tests are used: To see if you need glasses or contact lenses. To monitor an eye problem. To check an eye injury. Visual acuity tests are done as part of routine exams. You may also have this test when you get your 's license or apply for some types of jobs. Visual field tests  These tests are used: To check for vision loss in any area of your range of vision. To screen for certain eye diseases. To look for nerve damage after a stroke, head injury, or other problem that could reduce blood flow to the brain. Refraction and color tests  A refraction test is done to find the right prescription for glasses and contact lenses. A color vision test is done to check for color blindness. Color vision is often tested as part of a routine exam. You may also have this test when you apply for a job where recognizing different colors is important, such as , electronics, or the [employer]. How are vision tests done? Visual acuity test   You cover one eye at a time. You read aloud from a wall chart across the room.   You read aloud from a small card that you hold in your hand.  Refraction   You look into a special device. The device puts lenses of different strengths in front of each eye to see how strong your glasses or contact lenses need to be. Visual field tests   Your doctor may have you look through special machines. Or your doctor may simply have you stare straight ahead while they move a finger into and out of your field of vision. Color vision test   You look at pieces of printed test patterns in various colors. You say what number or symbol you see. Your doctor may have you trace the number or symbol using a pointer. How do these tests feel? There is very little chance of having a problem from this test. If dilating drops are used for a vision test, they may make the eyes sting and cause a medicine taste in the mouth. Follow-up care is a key part of your treatment and safety. Be sure to make and go to all appointments, and call your doctor if you are having problems. It's also a good idea to know your test results and keep a list of the medicines you take. Where can you learn more? Go to http://YuMe.york.com/ and enter G551 to learn more about \"Learning About Vision Tests. \"  Current as of: October 12, 2022               Content Version: 13.5  © 3432-5548 [employer]. Care instructions adapted under license by [facility] ([facility]). If you have questions about a medical condition or this instruction, always ask your healthcare professional. [place] [address] any warranty or liability for your use of this information. Advance Directives: Care Instructions  Overview  An advance directive is a legal way to state your wishes at the end of your life. It tells your family and your doctor what to do if you can't say what you want. There are two main types of advance directives. You can change them any time your wishes change. Living will.   This form tells your family and your doctor your wishes about life support and other treatment. The form is also called a declaration. Medical power of . This form lets you name a person to make treatment decisions for you when you can't speak for yourself. This person is called a health care agent (health care proxy, health care surrogate). The form is also called a durable power of  for health care. If you do not have an advance directive, decisions about your medical care may be made by a family member, or by a doctor or a  who doesn't know you. It may help to think of an advance directive as a gift to the people who care for you. If you have one, they won't have to make tough decisions by themselves. For more information, including forms for your state, see the [address] website (www.caringinfo.org/planning/advance-directives/). Follow-up care is a key part of your treatment and safety. Be sure to make and go to all appointments, and call your doctor if you are having problems. It's also a good idea to know your test results and keep a list of the medicines you take. What should you include in an advance directive? Many states have a unique advance directive form. (It may ask you to address specific issues.) Or you might use a universal form that's approved by many states. If your form doesn't tell you what to address, it may be hard to know what to include in your advance directive. Use the questions below to help you get started. Who do you want to make decisions about your medical care if you are not able to? What life-support measures do you want if you have a serious illness that gets worse over time or can't be cured? What are you most afraid of that might happen? (Maybe you're afraid of having pain, losing your independence, or being kept alive by machines.)  Where would you prefer to die? (Your home? A hospital? A nursing home?)  Do you want to donate your organs when you die? Do you want certain Baptism practices performed before you die?   When should you call for help? Be sure to contact your doctor if you have any questions. Where can you learn more? Go to http://www.york.com/ and enter R264 to learn more about \"Advance Directives: Care Instructions. \"  Current as of: June 16, 2022               Content Version: 13.5  © 4379-4442 Healthwise, [employer]. Care instructions adapted under license by [facility] ([facility]). If you have questions about a medical condition or this instruction, always ask your healthcare professional. [address] any warranty or liability for your use of this information. Personalized Preventive Plan for Judson Gomez - 1/23/2023  Medicare offers a range of preventive health benefits. Some of the tests and screenings are paid in full while other may be subject to a deductible, co-insurance, and/or copay. Some of these benefits include a comprehensive review of your medical history including lifestyle, illnesses that may run in your family, and various assessments and screenings as appropriate. After reviewing your medical record and screening and assessments performed today your provider may have ordered immunizations, labs, imaging, and/or referrals for you. A list of these orders (if applicable) as well as your Preventive Care list are included within your After Visit Summary for your review. Other Preventive Recommendations:    A preventive eye exam performed by an eye specialist is recommended every 1-2 years to screen for glaucoma; cataracts, macular degeneration, and other eye disorders. A preventive dental visit is recommended every 6 months. Try to get at least 150 minutes of exercise per week or 10,000 steps per day on a pedometer . Order or download the FREE \"Exercise & Physical Activity: Your Everyday Guide\" from [employer]. Call 7-422.281.8529 or search [employer] online.   You need 6562-7202 mg of calcium and 6708-2821 IU of vitamin D per day. It is possible to meet your calcium requirement with diet alone, but a vitamin D supplement is usually necessary to meet this goal.  When exposed to the sun, use a sunscreen that protects against both UVA and UVB radiation with an SPF of 30 or greater. Reapply every 2 to 3 hours or after sweating, drying off with a towel, or swimming. Always wear a seat belt when traveling in a car. Always wear a helmet when riding a bicycle or motorcycle.

## 2023-01-23 NOTE — PROGRESS NOTES
[facility]      2023    Patient Name: Odalys Cherry  :  1950    Subjective:    Chief Complaint:  Chief Complaint   Patient presents with    Medicare AWV     Pt is here for Medicare Wellness and to review labs. HPI Here for Medicare Wellness visit; patient had fasting labs done recently and results were reviewed in detail today; He has BPH, saw Dr. Valente in October; biopsy not felt to be indicated, is to f/u in 1 year; records reviewed; He has history of bilateral leg weakness, axonal polyneuropathy, had nerve conduction study at Dr. [facility] office in August; MRI lumbar ordered, along with additional labs; he was referred to PT; MRI has not been done yet; he had normal total CK, aldolase, KUNAL and PE serum, B12; records reviewed; He has KISHORE, has CPAP at home, but not using regularly;   Colonoscopy:   Eye exam: referral ordered  Dental exam:   Pneumovax:   Prevnar 13:   Shingrix:   Tdap:   Fluvax: 2022  Moderna Covid 19: 3/27, 2021  HTN:  Patient compliant with taking blood pressure medications: Yes  Discussed importance of following low sodium DASH diet, increasing physical activity, taking medications as ordered, decreasing alcohol intake, keeping f/u visits to recheck blood pressure, monitoring blood pressure at home and keeping a log, with goal blood pressure <140/90. Home bp readings are: 125/77      Past Medical History:   Diagnosis Date    Arrhythmia     LAST ECHO  ?  APCs    Elevated PSA     Dr Marek Thayer     Gout     medication    Hematuria 2011    Pre op      History of hepatitis A     Hypertension     controlled with medication    Malaria 1973    Mixed hyperlipidemia     ascvd risk 20.5 in 2016    Morbid obesity ([address]     Osteoarthritis of hip 2011    LEFT TKA    Pre-diabetes     Sleep apnea 2019    new diagnosis - not received cpap yet    Thyroid disease     hypothyroidism - medication       Past Surgical History:   Procedure Laterality Date    ABLATION OF DYSRHYTHMIC FOCUS  2019    COLONOSCOPY  2012    HEENT      lens implants    [address]    left middle finger repair bone chip    PROSTATE BIOPSY, NEEDLE, SATURATION SAMPLING      TONSILLECTOMY  1956    TOTAL HIP ARTHROPLASTY  4/2011    LEFT and right       Family History   Problem Relation Age of Onset    Lung Disease Mother         CHF    Lung Disease Father     Pseudochol. Deficiency Neg Hx     Post-op Cognitive Dysfunction Neg Hx     Emergence Delirium Neg Hx     Post-op Nausea/Vomiting Neg Hx     Delayed Awakening Neg Hx     Cancer Father         liver cancer    Other Sister     Elizabeth Dates Hypertherm Neg Hx        Social History     Tobacco Use    Smoking status: Never    Smokeless tobacco: Never   Substance Use Topics    Alcohol use: Yes     Alcohol/week: 7.0 standard drinks    Drug use: No                 Current Outpatient Medications:     losartan (COZAAR) 100 MG tablet, Take 1 tablet by mouth daily, Disp: 90 tablet, Rfl: 1    levothyroxine (SYNTHROID) 200 MCG tablet, Take 1 tablet by mouth every morning (before breakfast), Disp: 90 tablet, Rfl: 1    allopurinol (ZYLOPRIM) 300 MG tablet, Take 1 tablet by mouth daily, Disp: 90 tablet, Rfl: 1    aspirin 81 MG EC tablet, Take by mouth daily, Disp: , Rfl:     Allergies   Allergen Reactions    Other Other (See Comments)     Wasp. Dizziness; syncopal...the patient tested states not allergic to wasp 8/2/21       Review of Systems   Constitutional:  Negative for chills, fatigue and fever. HENT:  Negative for congestion, postnasal drip and rhinorrhea. Eyes:  Negative for visual disturbance. Respiratory:  Negative for cough and shortness of breath. Cardiovascular:  Negative for chest pain and palpitations. Gastrointestinal:  Negative for abdominal pain and blood in stool. Genitourinary:  Negative for dysuria and frequency. Musculoskeletal:  Negative for arthralgias and myalgias.    Skin: Negative. Neurological:  Negative for numbness and headaches. Psychiatric/Behavioral:  Negative for dysphoric mood and sleep disturbance. The patient is not nervous/anxious. All other systems reviewed and are negative. Objective:  /70   Pulse 67   Temp 97.9 °F (36.6 °C) (Temporal)   Resp 16   Ht 5' 11\" (1.803 m)   Wt 290 lb (131.5 kg)   SpO2 100%   BMI 40.45 kg/m²     Examination:  Physical Exam  Vitals reviewed. Constitutional:       Appearance: Normal appearance. He is obese. HENT:      Head: Normocephalic and atraumatic. Nose: Nose normal.      Mouth/Throat:      Mouth: Mucous membranes are moist.      Pharynx: Oropharynx is clear. Eyes:      Extraocular Movements: Extraocular movements intact. Conjunctiva/sclera: Conjunctivae normal.      Pupils: Pupils are equal, round, and reactive to light. Cardiovascular:      Rate and Rhythm: Normal rate and regular rhythm. Pulses: Normal pulses. Heart sounds: Normal heart sounds. Pulmonary:      Effort: Pulmonary effort is normal.      Breath sounds: Normal breath sounds. Abdominal:      General: Abdomen is flat. Bowel sounds are normal.      Palpations: Abdomen is soft. Musculoskeletal:         General: Normal range of motion. Cervical back: Normal range of motion and neck supple. Skin:     General: Skin is warm and dry. Neurological:      General: No focal deficit present. Mental Status: He is alert and oriented to person, place, and time. Mental status is at baseline. Psychiatric:         Mood and Affect: Mood normal.         Behavior: Behavior normal.         Assessment/Plan:    Candice Manuel was seen today for medicare awv. Diagnoses and all orders for this visit:    Medicare annual wellness visit, subsequent  Annual Exam: Goals for good health were addressed at today's visit:  -Reach and stay at a healthy weight. This can lower your risk of obesity, diabetes, heart disease and high blood pressure. -Get at least 30 minutes of physical activity daily. Walking, running, swimming, cycling or playing sports are good options.  -Do not smoke or allow others to smoke around you. -Use birth control if you do not want to have children at this time and barrier contraception to prevent or decrease risk of exposure to sexually transmitted diseases. -Use sunscreen daily, SPF of 15 or higher are best.  -See a dentist at least once yearly for checkups. -Have vision checked every 1-2 years.  -Wear a seat belt in the car.  -Avoid drinking in excess of 2 alcoholic drinks per day for men and 1 drink a day for women, or avoid drinking altogether.   -Watch closely for changes in our health and contact your doctor with any concerning problems or symptoms  -Age appropriate screening tests were updated and discussed at today's visit. -Immunization history was reviewed and updated at today's visit. Essential hypertension  Recommended low sodium diet; Goal: take meds as prescribed, monitor blood pressure at home and call with readings. -     losartan (COZAAR) 100 MG tablet; Take 1 tablet by mouth daily  -     Urinalysis; Future    Hypothyroidism due to acquired atrophy of thyroid  Stable on present medications, continue as prescribed. -     levothyroxine (SYNTHROID) 200 MCG tablet; Take 1 tablet by mouth every morning (before breakfast)  -     T4, Free; Future  -     TSH; Future    Idiopathic chronic gout of multiple sites without tophus  Stable, continue medication, diet. -     allopurinol (ZYLOPRIM) 300 MG tablet; Take 1 tablet by mouth daily    Typical atrial flutter ([address]  He has been asymptomatic, feeling well;     KISHORE (obstructive sleep apnea)  Recommended nightly compliance with CPAP, as he has not been using regularly;     Prediabetes  Recommended low fat, low cholesterol, low carbohydrate diet and regular exercise.   Patient is at risk for developing diabetes, and will strive to make lifestyle modifications. Will check HgbA1c at next office visit. -     Hemoglobin A1C; Future    Mixed hyperlipidemia  Recommended low fat, low cholesterol diet, regular exercise;     -     CBC with Auto Differential; Future  -     Comprehensive Metabolic Panel; Future  -     Lipid Panel; Future    Morbid obesity with body mass index (BMI) of 40.0 to 44.9 in adult [facility])  Recommended low fat, low cholesterol, low carbohydrate diet. Recommended increasing fresh fruits and vegetables in diet, eating lean meats, like fish and poultry, that are baked, broiled or grilled, not fried. Recommended regular exercise, 30 minutes of aerobic activity 4-5 days a week. Recommended monitoring weight closely and keeping follow-up appointments for recheck. Benign prostatic hyperplasia without lower urinary tract symptoms  Stable, has yearly f/u w/ Urology;     Glaucoma screening  -     AFL - [facility] for colon cancer  -     [facility]    Axonal polyneuropathy  He completed PT, states balance has improved, no longer using cane; he has regular f/u w/ Neurology; Follow-up and Dispositions    Return in 6 months (on 7/23/2023), or if symptoms worsen or fail to improve, for Medicare Annual Wellness Visit in 1 year, f/u w/ labs. Medication Reconciliation:  Current Medications Verified: Current medications/ immunizations were reviewed, including purpose, with patient. Family History, Social History, Current and Past Medical History was reviewed with patient and updated at today's office visit. Medication Reconciliation list was given to patient/ family. Patient was advised to discard old medication lists and provide all providers with current list at each visit and carry list with them in case of emergency.     Completed By:   Bessy Machado MD    [facility]  [address] [place] [address] 2050, [address], [address]  [phone] fax  3:08 [address] CISNEROS Annual Wellness Visit    Heriberto Foote is here for Medicare AWV (Pt is here for Medicare Wellness and to review labs. )    Assessment & Plan   Medicare annual wellness visit, subsequent  Essential hypertension  -     losartan (COZAAR) 100 MG tablet; Take 1 tablet by mouth daily, Disp-90 tablet, R-1Normal  -     Urinalysis; Future  Hypothyroidism due to acquired atrophy of thyroid  -     levothyroxine (SYNTHROID) 200 MCG tablet; Take 1 tablet by mouth every morning (before breakfast), Disp-90 tablet, R-1Normal  -     T4, Free; Future  -     TSH; Future  Idiopathic chronic gout of multiple sites without tophus  -     allopurinol (ZYLOPRIM) 300 MG tablet; Take 1 tablet by mouth daily, Disp-90 tablet, R-1Normal  Typical atrial flutter (HCC)  KISHORE (obstructive sleep apnea)  Prediabetes  -     Hemoglobin A1C; Future  Mixed hyperlipidemia  -     CBC with Auto Differential; Future  -     Comprehensive Metabolic Panel; Future  -     Lipid Panel; Future  Morbid obesity with body mass index (BMI) of 40.0 to 44.9 in [facility])  Benign prostatic hyperplasia without lower urinary tract symptoms  Glaucoma screening  -     AFL - [facility]  Screen for colon cancer  -     [facility] - [facility]  Axonal polyneuropathy      Recommendations for Preventive Services Due: see orders and patient instructions/AVS.  Recommended screening schedule for the next 5-10 years is provided to the patient in written form: see Patient Instructions/AVS.     Return in 6 months (on 7/23/2023), or if symptoms worsen or fail to improve, for Medicare Annual Wellness Visit in 1 year, f/u w/ labs. Subjective       Patient's complete Health Risk Assessment and screening values have been reviewed and are found in Flowsheets. The following problems were reviewed today and where indicated follow up appointments were made and/or referrals ordered.     Positive Risk Factor Screenings with Interventions:    Fall Risk:  Do you feel unsteady or are you worried about falling? : (!) yes  2 or more falls in past year?: (!) yes  Fall with injury in past year?: no     Interventions:    Patient comments: he completed PT, currently ambulating wo cane and feeling much better than before;               Weight and Activity:  Physical Activity: Inactive    Days of Exercise per Week: 0 days    Minutes of Exercise per Session: 0 min     On average, how many days per week do you engage in moderate to strenuous exercise (like a brisk walk)?: 0 days  Have you lost any weight without trying in the past 3 months?: No  Body mass index: (!) 40.44      Inactivity Interventions:  Patient comments: he walks up and down the stairs at home  Obesity Interventions:  low carbohydrate diet            Vision Screen:  Do you have difficulty driving, watching TV, or doing any of your daily activities because of your eyesight?: No  Have you had an eye exam within the past year?: (!) No  No results found. Interventions:   Patient declines any further evaluation or treatment                      Objective   Vitals:    01/23/23 1403   BP: 131/70   Pulse: 67   Resp: 16   Temp: 97.9 °F (36.6 °C)   TempSrc: Temporal   SpO2: 100%   Weight: 290 lb (131.5 kg)   Height: 5' 11\" (1.803 m)      Body mass index is 40.45 kg/m². Allergies   Allergen Reactions    Other Other (See Comments)     Wasp. Dizziness; syncopal...the patient tested states not allergic to wasp 8/2/21     Prior to Visit Medications    Medication Sig Taking?  Authorizing Provider   losartan (COZAAR) 100 MG tablet Take 1 tablet by mouth daily Yes Derek Elise MD   levothyroxine (SYNTHROID) 200 MCG tablet Take 1 tablet by mouth every morning (before breakfast) Yes Derek Elise MD   allopurinol (ZYLOPRIM) 300 MG tablet Take 1 tablet by mouth daily Yes Derek Elise MD   aspirin 81 MG EC tablet Take by mouth daily Yes Ar Automatic Reconciliation       CareTeam (Including outside providers/suppliers regularly involved in providing care):   Patient Care Team:  Fany Garcia MD as PCP - Lisandro Gordon MD as PCP - [facility] Empaneled Provider     Reviewed and updated this visit:  Tobacco  Allergies  Meds  Problems  Med Hx  Surg Hx  Soc Hx  Fam Hx

## 2023-02-03 DIAGNOSIS — Z12.11 SCREEN FOR COLON CANCER: ICD-10-CM

## 2023-03-09 ENCOUNTER — TELEPHONE (OUTPATIENT)
Dept: INTERNAL MEDICINE CLINIC | Facility: CLINIC | Age: 73
End: 2023-03-09

## 2023-03-09 NOTE — TELEPHONE ENCOUNTER
----- Message from [employer] sent at 3/8/2023  3:24 PM EST -----  Subject: Message to Provider    QUESTIONS  Information for Provider? PT wants to reschedule appt and lab test on July   [place]. Please call PT back to reschedule at   [phone]  ---------------------------------------------------------------------------  --------------  [id number] [facility]  [phone]; OK to leave message on voicemail  ---------------------------------------------------------------------------  --------------  SCRIPT ANSWERS  Relationship to Patient?  Self

## 2023-05-15 ENCOUNTER — OFFICE VISIT (OUTPATIENT)
Age: 73
End: 2023-05-15
Payer: COMMERCIAL

## 2023-05-15 VITALS
HEIGHT: 71 IN | SYSTOLIC BLOOD PRESSURE: 140 MMHG | BODY MASS INDEX: 41.3 KG/M2 | DIASTOLIC BLOOD PRESSURE: 66 MMHG | WEIGHT: 295 LBS | HEART RATE: 86 BPM

## 2023-05-15 DIAGNOSIS — I48.3 TYPICAL ATRIAL FLUTTER (HCC): ICD-10-CM

## 2023-05-15 DIAGNOSIS — I10 ESSENTIAL (PRIMARY) HYPERTENSION: Primary | ICD-10-CM

## 2023-05-15 DIAGNOSIS — I10 ESSENTIAL HYPERTENSION: ICD-10-CM

## 2023-05-15 DIAGNOSIS — E78.2 MIXED HYPERLIPIDEMIA: ICD-10-CM

## 2023-05-15 DIAGNOSIS — G47.33 OSA (OBSTRUCTIVE SLEEP APNEA): ICD-10-CM

## 2023-05-15 PROCEDURE — 3077F SYST BP >= 140 MM HG: CPT | Performed by: INTERNAL MEDICINE

## 2023-05-15 PROCEDURE — 93000 ELECTROCARDIOGRAM COMPLETE: CPT | Performed by: INTERNAL MEDICINE

## 2023-05-15 PROCEDURE — 1123F ACP DISCUSS/DSCN MKR DOCD: CPT | Performed by: INTERNAL MEDICINE

## 2023-05-15 PROCEDURE — 99214 OFFICE O/P EST MOD 30 MIN: CPT | Performed by: INTERNAL MEDICINE

## 2023-05-15 PROCEDURE — 3078F DIAST BP <80 MM HG: CPT | Performed by: INTERNAL MEDICINE

## 2023-05-15 RX ORDER — LOSARTAN POTASSIUM 100 MG/1
100 TABLET ORAL DAILY
Qty: 90 TABLET | Refills: 3 | Status: SHIPPED | OUTPATIENT
Start: 2023-05-15

## 2023-05-15 NOTE — PROGRESS NOTES
[address], [address] [facility], [address] [place] [address]  PHONE: [phone]     05/15/23    NAME:  Zachary Abbott  : 1950  MRN: [id number]       SUBJECTIVE:   Zachary Abbott is a 67 y.o. male seen for a follow up visit regarding the following:     Chief Complaint   Patient presents with    Hypertension    Annual Exam       HPI:   19 A flutter ablation with Dr. Leonid Vaughan. KORY 2019: normal EF. KISHORE - not on CPAP now. NST 2019 and 10/2020: normal, low risk      No new angina. No new BE, SOB. Lost 20 pds, diet better. BP better now. No new palp now. On ASA now. Never on statin. Patient denies recent history of orthopnea, PND, excessive dizziness and/or syncope. He is semi-retired now. Rare alcohol. Daily 10 coffee cups per day. Past Medical History, Past Surgical History, Family history, Social History, and Medications were all reviewed with the patient today and updated as necessary. Current Outpatient Medications   Medication Sig Dispense Refill    losartan (COZAAR) 100 MG tablet Take 1 tablet by mouth daily 90 tablet 3    levothyroxine (SYNTHROID) 200 MCG tablet Take 1 tablet by mouth every morning (before breakfast) 90 tablet 1    allopurinol (ZYLOPRIM) 300 MG tablet Take 1 tablet by mouth daily 90 tablet 1    aspirin 81 MG EC tablet Take by mouth daily       No current facility-administered medications for this visit. Allergies   Allergen Reactions    Other Other (See Comments)     Wasp.  Dizziness; syncopal...the patient tested states not allergic to wasp 21     Patient Active Problem List    Diagnosis Date Noted    Myopathy 2022     Priority: Medium    Axonal polyneuropathy 2022     Priority: Medium    Leg weakness, bilateral 2022     Priority: Medium    Numbness and tingling of both feet 2022     Priority: Medium    Gluteal pain 2022    Other disturbances of skin sensation 2022    Prediabetes     Mixed

## 2023-06-16 ENCOUNTER — PATIENT MESSAGE (OUTPATIENT)
Dept: INTERNAL MEDICINE CLINIC | Facility: CLINIC | Age: 73
End: 2023-06-16

## 2023-06-19 NOTE — TELEPHONE ENCOUNTER
From: Blanca Portillo  To: Dr. Jacobo Sic: 6/16/2023 4:47 PM EDT  Subject: My appointment in July I had cancelled     I reset my appointment to August 7. Someone was supposed to call me back to set up the lab work appointment for July 31. [place] of [place] and this is why I had to move this. Please call thanks.   Hugo    [phone] Quit 20yrs ago/cigarettes

## 2023-07-31 ENCOUNTER — HOSPITAL ENCOUNTER (OUTPATIENT)
Dept: LAB | Age: 73
Discharge: HOME | End: 2023-08-03

## 2023-07-31 DIAGNOSIS — E78.2 MIXED HYPERLIPIDEMIA: ICD-10-CM

## 2023-07-31 DIAGNOSIS — R73.03 PREDIABETES: ICD-10-CM

## 2023-07-31 DIAGNOSIS — E03.4 HYPOTHYROIDISM DUE TO ACQUIRED ATROPHY OF THYROID: ICD-10-CM

## 2023-07-31 LAB
BASOPHILS # BLD: 0 K/UL (ref 0–0.2)
BASOPHILS NFR BLD: 1 % (ref 0–2)
DIFFERENTIAL METHOD BLD: ABNORMAL
EOSINOPHIL # BLD: 0.2 K/UL (ref 0–0.8)
EOSINOPHIL NFR BLD: 3 % (ref 0.5–7.8)
ERYTHROCYTE [DISTWIDTH] IN BLOOD BY AUTOMATED COUNT: 13.1 % (ref 11.9–14.6)
HCT VFR BLD AUTO: 41 % (ref 41.1–50.3)
HGB BLD-MCNC: 14 G/DL (ref 13.6–17.2)
IMM GRANULOCYTES # BLD AUTO: 0 K/UL (ref 0–0.5)
IMM GRANULOCYTES NFR BLD AUTO: 1 % (ref 0–5)
LYMPHOCYTES # BLD: 1.1 K/UL (ref 0.5–4.6)
LYMPHOCYTES NFR BLD: 18 % (ref 13–44)
MCH RBC QN AUTO: 31 PG (ref 26.1–32.9)
MCHC RBC AUTO-ENTMCNC: 34.1 G/DL (ref 31.4–35)
MCV RBC AUTO: 90.9 FL (ref 82–102)
MONOCYTES # BLD: 0.4 K/UL (ref 0.1–1.3)
MONOCYTES NFR BLD: 6 % (ref 4–12)
NEUTS SEG # BLD: 4.2 K/UL (ref 1.7–8.2)
NEUTS SEG NFR BLD: 71 % (ref 43–78)
NRBC # BLD: 0 K/UL (ref 0–0.2)
PLATELET # BLD AUTO: 174 K/UL (ref 150–450)
PMV BLD AUTO: 10 FL (ref 9.4–12.3)
RBC # BLD AUTO: 4.51 M/UL (ref 4.23–5.6)
WBC # BLD AUTO: 5.9 K/UL (ref 4.3–11.1)

## 2023-08-01 LAB
ALBUMIN SERPL-MCNC: 3.9 G/DL (ref 3.2–4.6)
ALBUMIN/GLOB SERPL: 1.6 (ref 0.4–1.6)
ALP SERPL-CCNC: 70 U/L (ref 50–136)
ALT SERPL-CCNC: 21 U/L (ref 12–65)
ANION GAP SERPL CALC-SCNC: 9 MMOL/L (ref 2–11)
AST SERPL-CCNC: 17 U/L (ref 15–37)
BILIRUB SERPL-MCNC: 1.4 MG/DL (ref 0.2–1.1)
BUN SERPL-MCNC: 13 MG/DL (ref 8–23)
CALCIUM SERPL-MCNC: 9.4 MG/DL (ref 8.3–10.4)
CHLORIDE SERPL-SCNC: 114 MMOL/L (ref 101–110)
CHOLEST SERPL-MCNC: 157 MG/DL
CO2 SERPL-SCNC: 23 MMOL/L (ref 21–32)
CREAT SERPL-MCNC: 0.8 MG/DL (ref 0.8–1.5)
EST. AVERAGE GLUCOSE BLD GHB EST-MCNC: 105 MG/DL
GLOBULIN SER CALC-MCNC: 2.5 G/DL (ref 2.8–4.5)
GLUCOSE SERPL-MCNC: 89 MG/DL (ref 65–100)
HBA1C MFR BLD: 5.3 % (ref 4.8–5.6)
HDLC SERPL-MCNC: 39 MG/DL (ref 40–60)
HDLC SERPL: 4
LDLC SERPL CALC-MCNC: 100 MG/DL
POTASSIUM SERPL-SCNC: 4 MMOL/L (ref 3.5–5.1)
PROT SERPL-MCNC: 6.4 G/DL (ref 6.3–8.2)
SODIUM SERPL-SCNC: 146 MMOL/L (ref 133–143)
T4 FREE SERPL-MCNC: 1.2 NG/DL (ref 0.78–1.46)
TRIGL SERPL-MCNC: 90 MG/DL (ref 35–150)
TSH, 3RD GENERATION: 0.82 UIU/ML (ref 0.36–3.74)
VLDLC SERPL CALC-MCNC: 18 MG/DL (ref 6–23)

## 2023-08-05 SDOH — ECONOMIC STABILITY: HOUSING INSECURITY
IN THE LAST 12 MONTHS, WAS THERE A TIME WHEN YOU DID NOT HAVE A STEADY PLACE TO SLEEP OR SLEPT IN A SHELTER (INCLUDING NOW)?: NO

## 2023-08-05 SDOH — ECONOMIC STABILITY: TRANSPORTATION INSECURITY
IN THE PAST 12 MONTHS, HAS LACK OF TRANSPORTATION KEPT YOU FROM MEETINGS, WORK, OR FROM GETTING THINGS NEEDED FOR DAILY LIVING?: NO

## 2023-08-05 SDOH — ECONOMIC STABILITY: INCOME INSECURITY: HOW HARD IS IT FOR YOU TO PAY FOR THE VERY BASICS LIKE FOOD, HOUSING, MEDICAL CARE, AND HEATING?: NOT HARD AT ALL

## 2023-08-05 SDOH — ECONOMIC STABILITY: FOOD INSECURITY: WITHIN THE PAST 12 MONTHS, YOU WORRIED THAT YOUR FOOD WOULD RUN OUT BEFORE YOU GOT MONEY TO BUY MORE.: NEVER TRUE

## 2023-08-05 SDOH — ECONOMIC STABILITY: FOOD INSECURITY: WITHIN THE PAST 12 MONTHS, THE FOOD YOU BOUGHT JUST DIDN'T LAST AND YOU DIDN'T HAVE MONEY TO GET MORE.: NEVER TRUE

## 2023-08-07 ENCOUNTER — OFFICE VISIT (OUTPATIENT)
Dept: INTERNAL MEDICINE CLINIC | Facility: CLINIC | Age: 73
End: 2023-08-07
Payer: COMMERCIAL

## 2023-08-07 VITALS
RESPIRATION RATE: 16 BRPM | TEMPERATURE: 98.5 F | DIASTOLIC BLOOD PRESSURE: 68 MMHG | BODY MASS INDEX: 40.97 KG/M2 | HEART RATE: 78 BPM | HEIGHT: 70 IN | OXYGEN SATURATION: 96 % | WEIGHT: 286.2 LBS | SYSTOLIC BLOOD PRESSURE: 138 MMHG

## 2023-08-07 DIAGNOSIS — G62.9 AXONAL POLYNEUROPATHY: ICD-10-CM

## 2023-08-07 DIAGNOSIS — M1A.09X0 IDIOPATHIC CHRONIC GOUT OF MULTIPLE SITES WITHOUT TOPHUS: ICD-10-CM

## 2023-08-07 DIAGNOSIS — E78.2 MIXED HYPERLIPIDEMIA: ICD-10-CM

## 2023-08-07 DIAGNOSIS — I10 ESSENTIAL HYPERTENSION: Primary | ICD-10-CM

## 2023-08-07 DIAGNOSIS — G47.33 OSA (OBSTRUCTIVE SLEEP APNEA): ICD-10-CM

## 2023-08-07 DIAGNOSIS — E66.01 MORBID OBESITY WITH BODY MASS INDEX (BMI) OF 40.0 TO 44.9 IN ADULT (HCC): ICD-10-CM

## 2023-08-07 DIAGNOSIS — E03.4 HYPOTHYROIDISM DUE TO ACQUIRED ATROPHY OF THYROID: ICD-10-CM

## 2023-08-07 DIAGNOSIS — R73.03 PREDIABETES: ICD-10-CM

## 2023-08-07 DIAGNOSIS — R35.1 NOCTURIA: ICD-10-CM

## 2023-08-07 DIAGNOSIS — I48.3 TYPICAL ATRIAL FLUTTER (HCC): ICD-10-CM

## 2023-08-07 DIAGNOSIS — E55.9 VITAMIN D INSUFFICIENCY: ICD-10-CM

## 2023-08-07 PROCEDURE — 99214 OFFICE O/P EST MOD 30 MIN: CPT | Performed by: INTERNAL MEDICINE

## 2023-08-07 PROCEDURE — 3078F DIAST BP <80 MM HG: CPT | Performed by: INTERNAL MEDICINE

## 2023-08-07 PROCEDURE — 3075F SYST BP GE 130 - 139MM HG: CPT | Performed by: INTERNAL MEDICINE

## 2023-08-07 PROCEDURE — 1123F ACP DISCUSS/DSCN MKR DOCD: CPT | Performed by: INTERNAL MEDICINE

## 2023-08-07 RX ORDER — ALLOPURINOL 300 MG/1
300 TABLET ORAL DAILY
Qty: 90 TABLET | Refills: 1 | Status: SHIPPED | OUTPATIENT
Start: 2023-08-07

## 2023-08-07 RX ORDER — LEVOTHYROXINE SODIUM 0.2 MG/1
200 TABLET ORAL
Qty: 90 TABLET | Refills: 1 | Status: SHIPPED | OUTPATIENT
Start: 2023-08-07

## 2023-08-07 ASSESSMENT — ENCOUNTER SYMPTOMS
RHINORRHEA: 0
BLOOD IN STOOL: 0
SHORTNESS OF BREATH: 0
ABDOMINAL PAIN: 0
COUGH: 0

## 2023-08-07 ASSESSMENT — PATIENT HEALTH QUESTIONNAIRE - PHQ9
1. LITTLE INTEREST OR PLEASURE IN DOING THINGS: 0
SUM OF ALL RESPONSES TO PHQ QUESTIONS 1-9: 0
SUM OF ALL RESPONSES TO PHQ QUESTIONS 1-9: 0
SUM OF ALL RESPONSES TO PHQ9 QUESTIONS 1 & 2: 0
SUM OF ALL RESPONSES TO PHQ QUESTIONS 1-9: 0
SUM OF ALL RESPONSES TO PHQ QUESTIONS 1-9: 0
2. FEELING DOWN, DEPRESSED OR HOPELESS: 0

## 2023-08-07 NOTE — PROGRESS NOTES
gout of multiple sites without tophus  Stable, continue medication, diet. -     allopurinol (ZYLOPRIM) 300 MG tablet; Take 1 tablet by mouth daily  -     Uric Acid; Future    Typical atrial flutter (HCC)  Stable. He has regular f/u w/ Cardiology;     KISHORE (obstructive sleep apnea)  He has not been able to tolerate CPAP, would like to hold off on trial of alternate style masks for now; Axonal polyneuropathy  Stable, ambulates with cane, has regular f/u w/ Neurology; Referred to Podiatry;     Prediabetes  Recommended low fat, low cholesterol, low carbohydrate diet and regular exercise. Patient is at risk for developing diabetes, and will strive to make lifestyle modifications. Will check HgbA1c at next office visit. -     Hemoglobin A1C; Future    Mixed hyperlipidemia  Recommended low fat, low cholesterol diet, regular exercise. -     CBC with Auto Differential; Future  -     Comprehensive Metabolic Panel; Future  -     Lipid Panel; Future    Morbid obesity with body mass index (BMI) of 40.0 to 44.9 in adult [facility])  Recommended low fat, low cholesterol, low carbohydrate diet. Recommended increasing fresh fruits and vegetables in diet, eating lean meats, like fish and poultry, that are baked, broiled or grilled, not fried. Recommended regular exercise, 30 minutes of aerobic activity 4-5 days a week. Recommended monitoring weight closely and keeping follow-up appointments for recheck. Nocturia  -     PSA, Total and Free; Future    Vitamin D insufficiency  -     Vitamin D 25 Hydroxy; Future          Follow-up and Dispositions    Return in about 6 months (around 2/7/2024), or if symptoms worsen or fail to improve, for medicare annual w/ labs. Medication Reconciliation:  Current Medications Verified: Current medications/ immunizations were reviewed, including purpose, with patient.   Family History, Social History, Current and Past Medical History was reviewed with patient and updated at today's

## 2023-10-04 ENCOUNTER — TELEPHONE (OUTPATIENT)
Dept: UROLOGY | Age: 73
End: 2023-10-04

## 2023-10-04 NOTE — TELEPHONE ENCOUNTER
Pt sent a [employer] message stating \"Requesting for an annual check appointment on a Monday. It has been a year\". Called pt but had to [facility]. Appt scheduled for 10/23/23. Pt informed via VM and [employer] response.

## 2023-10-23 ENCOUNTER — OFFICE VISIT (OUTPATIENT)
Dept: UROLOGY | Age: 73
End: 2023-10-23
Payer: COMMERCIAL

## 2023-10-23 DIAGNOSIS — N40.0 BPH WITHOUT OBSTRUCTION/LOWER URINARY TRACT SYMPTOMS: Primary | ICD-10-CM

## 2023-10-23 LAB
BILIRUBIN, URINE, POC: NEGATIVE
BLOOD URINE, POC: NORMAL
GLUCOSE URINE, POC: NEGATIVE
KETONES, URINE, POC: NEGATIVE
LEUKOCYTE ESTERASE, URINE, POC: NEGATIVE
NITRITE, URINE, POC: NEGATIVE
PH, URINE, POC: 6 (ref 4.6–8)
PROTEIN,URINE, POC: NEGATIVE
SPECIFIC GRAVITY, URINE, POC: 1.02 (ref 1–1.03)
URINALYSIS CLARITY, POC: NORMAL
URINALYSIS COLOR, POC: NORMAL
UROBILINOGEN, POC: NORMAL

## 2023-10-23 PROCEDURE — 99213 OFFICE O/P EST LOW 20 MIN: CPT | Performed by: UROLOGY

## 2023-10-23 PROCEDURE — 1123F ACP DISCUSS/DSCN MKR DOCD: CPT | Performed by: UROLOGY

## 2023-10-23 PROCEDURE — 81003 URINALYSIS AUTO W/O SCOPE: CPT | Performed by: UROLOGY

## 2023-10-23 ASSESSMENT — ENCOUNTER SYMPTOMS
NAUSEA: 0
BACK PAIN: 0

## 2023-10-23 NOTE — PROGRESS NOTES
[address]  [phone]          Main Najera  : 1950    Chief Complaint   Patient presents with    Follow-up          HPI     Main Najera is a 75 y.o. male    PSA 5.2023  PSA 5.2022  PSA 6.4 2020  PSA 3.2015  PSA 4.8 May 2013  PSA 6.21 in 2012    The patient has no significant voiding problems. He denies flank pain or gross hematuria. There is no family history of prostate cancer. His greatest PSA value was in 2012. Past Medical History:   Diagnosis Date    Arrhythmia 2006    LAST ECHO  ? APCs    Elevated PSA     Dr Jeremias Arriaga     Gout     medication    Hematuria 2011    Pre op      History of hepatitis A     Hypertension     controlled with medication    Malaria     Mixed hyperlipidemia     ascvd risk 20.5 in 2016    Morbid obesity ([address])     Osteoarthritis of hip 2011    LEFT TKA    Pre-diabetes     Sleep apnea 2019    new diagnosis - not received cpap yet    Thyroid disease     hypothyroidism - medication     Past Surgical History:   Procedure Laterality Date    ABLATION OF DYSRHYTHMIC FOCUS  2019    COLONOSCOPY  2023    HEENT      lens implants    [address]    left middle finger repair bone chip    PROSTATE BIOPSY, NEEDLE, SATURATION SAMPLING      TONSILLECTOMY      TOTAL HIP ARTHROPLASTY  2011    LEFT and right     Current Outpatient Medications   Medication Sig Dispense Refill    levothyroxine (SYNTHROID) 200 MCG tablet Take 1 tablet by mouth every morning (before breakfast) 90 tablet 1    allopurinol (ZYLOPRIM) 300 MG tablet Take 1 tablet by mouth daily 90 tablet 1    losartan (COZAAR) 100 MG tablet Take 1 tablet by mouth daily 90 tablet 3    aspirin 81 MG EC tablet Take by mouth daily       No current facility-administered medications for this visit. Allergies   Allergen Reactions    Other Other (See Comments)     Wasp.  Dizziness;

## 2023-12-04 ENCOUNTER — TELEPHONE (OUTPATIENT)
Age: 73
End: 2023-12-04

## 2023-12-04 NOTE — TELEPHONE ENCOUNTER
[phone]  According to their records patient is due for a refill of Losartin, the patient states he still has 70 tablets left and they are concerned he may not be taking his medication properly.

## 2024-02-05 ENCOUNTER — HOSPITAL ENCOUNTER (OUTPATIENT)
Dept: LAB | Age: 74
Discharge: HOME | End: 2024-02-08

## 2024-02-05 DIAGNOSIS — E55.9 VITAMIN D INSUFFICIENCY: ICD-10-CM

## 2024-02-05 DIAGNOSIS — I10 ESSENTIAL HYPERTENSION: ICD-10-CM

## 2024-02-05 DIAGNOSIS — E03.4 HYPOTHYROIDISM DUE TO ACQUIRED ATROPHY OF THYROID: ICD-10-CM

## 2024-02-05 DIAGNOSIS — R73.03 PREDIABETES: ICD-10-CM

## 2024-02-05 DIAGNOSIS — E78.2 MIXED HYPERLIPIDEMIA: ICD-10-CM

## 2024-02-05 DIAGNOSIS — M1A.09X0 IDIOPATHIC CHRONIC GOUT OF MULTIPLE SITES WITHOUT TOPHUS: ICD-10-CM

## 2024-02-05 DIAGNOSIS — R35.1 NOCTURIA: ICD-10-CM

## 2024-02-05 LAB
25(OH)D3 SERPL-MCNC: 23.7 NG/ML (ref 30–100)
ALBUMIN SERPL-MCNC: 3.8 G/DL (ref 3.2–4.6)
ALBUMIN/GLOB SERPL: 1.3 (ref 0.4–1.6)
ALP SERPL-CCNC: 62 U/L (ref 50–136)
ALT SERPL-CCNC: 28 U/L (ref 12–65)
ANION GAP SERPL CALC-SCNC: 2 MMOL/L (ref 2–11)
APPEARANCE UR: CLEAR
AST SERPL-CCNC: 26 U/L (ref 15–37)
BACTERIA URNS QL MICRO: NEGATIVE /HPF
BASOPHILS # BLD: 0.1 K/UL (ref 0–0.2)
BASOPHILS NFR BLD: 1 % (ref 0–2)
BILIRUB SERPL-MCNC: 0.8 MG/DL (ref 0.2–1.1)
BILIRUB UR QL: NEGATIVE
BUN SERPL-MCNC: 18 MG/DL (ref 8–23)
CALCIUM SERPL-MCNC: 8.8 MG/DL (ref 8.3–10.4)
CASTS URNS QL MICRO: ABNORMAL /LPF (ref 0–2)
CHLORIDE SERPL-SCNC: 113 MMOL/L (ref 103–113)
CHOLEST SERPL-MCNC: 188 MG/DL
CO2 SERPL-SCNC: 27 MMOL/L (ref 21–32)
COLOR UR: ABNORMAL
CREAT SERPL-MCNC: 0.9 MG/DL (ref 0.8–1.5)
DIFFERENTIAL METHOD BLD: NORMAL
EOSINOPHIL # BLD: 0.2 K/UL (ref 0–0.8)
EOSINOPHIL NFR BLD: 3 % (ref 0.5–7.8)
EPI CELLS #/AREA URNS HPF: ABNORMAL /HPF (ref 0–5)
ERYTHROCYTE [DISTWIDTH] IN BLOOD BY AUTOMATED COUNT: 12.7 % (ref 11.9–14.6)
GLOBULIN SER CALC-MCNC: 3 G/DL (ref 2.8–4.5)
GLUCOSE SERPL-MCNC: 120 MG/DL (ref 65–100)
GLUCOSE UR STRIP.AUTO-MCNC: NEGATIVE MG/DL
HCT VFR BLD AUTO: 42.8 % (ref 41.1–50.3)
HDLC SERPL-MCNC: 48 MG/DL (ref 40–60)
HDLC SERPL: 3.9
HGB BLD-MCNC: 14.1 G/DL (ref 13.6–17.2)
HGB UR QL STRIP: ABNORMAL
IMM GRANULOCYTES # BLD AUTO: 0.1 K/UL (ref 0–0.5)
IMM GRANULOCYTES NFR BLD AUTO: 1 % (ref 0–5)
KETONES UR QL STRIP.AUTO: NEGATIVE MG/DL
LDLC SERPL CALC-MCNC: 122.4 MG/DL
LEUKOCYTE ESTERASE UR QL STRIP.AUTO: NEGATIVE
LYMPHOCYTES # BLD: 1.2 K/UL (ref 0.5–4.6)
LYMPHOCYTES NFR BLD: 18 % (ref 13–44)
MCH RBC QN AUTO: 30.5 PG (ref 26.1–32.9)
MCHC RBC AUTO-ENTMCNC: 32.9 G/DL (ref 31.4–35)
MCV RBC AUTO: 92.4 FL (ref 82–102)
MONOCYTES # BLD: 0.5 K/UL (ref 0.1–1.3)
MONOCYTES NFR BLD: 7 % (ref 4–12)
NEUTS SEG # BLD: 4.8 K/UL (ref 1.7–8.2)
NEUTS SEG NFR BLD: 70 % (ref 43–78)
NITRITE UR QL STRIP.AUTO: NEGATIVE
NRBC # BLD: 0 K/UL (ref 0–0.2)
PH UR STRIP: 6.5 (ref 5–9)
PLATELET # BLD AUTO: 186 K/UL (ref 150–450)
PMV BLD AUTO: 10.1 FL (ref 9.4–12.3)
POTASSIUM SERPL-SCNC: 3.9 MMOL/L (ref 3.5–5.1)
PROT SERPL-MCNC: 6.8 G/DL (ref 6.3–8.2)
PROT UR STRIP-MCNC: NEGATIVE MG/DL
RBC # BLD AUTO: 4.63 M/UL (ref 4.23–5.6)
RBC #/AREA URNS HPF: ABNORMAL /HPF (ref 0–5)
SODIUM SERPL-SCNC: 142 MMOL/L (ref 136–146)
SP GR UR REFRACTOMETRY: 1.02 (ref 1–1.02)
T4 FREE SERPL-MCNC: 0.9 NG/DL (ref 0.78–1.46)
TRIGL SERPL-MCNC: 88 MG/DL (ref 35–150)
TSH, 3RD GENERATION: 2.63 UIU/ML (ref 0.36–3.74)
URATE SERPL-MCNC: 7 MG/DL (ref 2.6–6)
UROBILINOGEN UR QL STRIP.AUTO: 0.2 EU/DL (ref 0.2–1)
VLDLC SERPL CALC-MCNC: 17.6 MG/DL (ref 6–23)
WBC # BLD AUTO: 6.8 K/UL (ref 4.3–11.1)
WBC URNS QL MICRO: ABNORMAL /HPF (ref 0–4)

## 2024-02-06 LAB
EST. AVERAGE GLUCOSE BLD GHB EST-MCNC: 111 MG/DL
HBA1C MFR BLD: 5.5 % (ref 4.8–5.6)
PSA FREE MFR SERPL: 21.7 %
PSA FREE SERPL-MCNC: 1 NG/ML
PSA SERPL-MCNC: 4.6 NG/ML

## 2024-02-12 ENCOUNTER — TELEPHONE (OUTPATIENT)
Dept: INTERNAL MEDICINE CLINIC | Facility: CLINIC | Age: 74
End: 2024-02-12

## 2024-02-12 NOTE — TELEPHONE ENCOUNTER
Pt visited office to r/s missed appt. Pt asks if he can switch to Dr. Hayes due to his schedule being incompatible w/Dr. Tineo due to schedule changes.

## 2024-02-19 DIAGNOSIS — E03.4 HYPOTHYROIDISM DUE TO ACQUIRED ATROPHY OF THYROID: ICD-10-CM

## 2024-02-19 DIAGNOSIS — M1A.09X0 IDIOPATHIC CHRONIC GOUT OF MULTIPLE SITES WITHOUT TOPHUS: ICD-10-CM

## 2024-02-20 RX ORDER — ALLOPURINOL 300 MG/1
300 TABLET ORAL DAILY
Qty: 90 TABLET | Refills: 1 | OUTPATIENT
Start: 2024-02-20

## 2024-02-20 RX ORDER — LEVOTHYROXINE SODIUM 200 MCG
TABLET ORAL
Qty: 90 TABLET | Refills: 1 | OUTPATIENT
Start: 2024-02-20

## 2024-02-23 ENCOUNTER — TELEPHONE (OUTPATIENT)
Dept: INTERNAL MEDICINE CLINIC | Facility: CLINIC | Age: 74
End: 2024-02-23

## 2024-02-23 NOTE — TELEPHONE ENCOUNTER
----- Message from Jazmine Mora sent at 2/22/2024  4:07 PM EST -----  Subject: Message to Provider    QUESTIONS  Information for Provider? Patient called to follow up on the switching of   the doctors, as he needs to have a doctor that can see him on Mondays due   to his schedule, as he is only in [place] on that day of the week. Please call   patient back regarding this request.  ---------------------------------------------------------------------------  --------------  CALL BACK INFO  [phone]; OK to leave message on voicemail,OK to respond with electronic   message via [employer] portal (only for patients who have registered [employer]   account)  ---------------------------------------------------------------------------  --------------  SCRIPT ANSWERS  Relationship to Patient? Self

## 2024-03-11 ENCOUNTER — TELEMEDICINE (OUTPATIENT)
Dept: INTERNAL MEDICINE CLINIC | Facility: CLINIC | Age: 74
End: 2024-03-11
Payer: COMMERCIAL

## 2024-03-11 DIAGNOSIS — I48.3 TYPICAL ATRIAL FLUTTER (HCC): ICD-10-CM

## 2024-03-11 DIAGNOSIS — I10 ESSENTIAL HYPERTENSION: Primary | ICD-10-CM

## 2024-03-11 DIAGNOSIS — M1A.09X0 IDIOPATHIC CHRONIC GOUT OF MULTIPLE SITES WITHOUT TOPHUS: ICD-10-CM

## 2024-03-11 DIAGNOSIS — G62.9 AXONAL POLYNEUROPATHY: ICD-10-CM

## 2024-03-11 DIAGNOSIS — G47.33 OSA (OBSTRUCTIVE SLEEP APNEA): ICD-10-CM

## 2024-03-11 DIAGNOSIS — N40.0 BENIGN PROSTATIC HYPERPLASIA WITHOUT LOWER URINARY TRACT SYMPTOMS: ICD-10-CM

## 2024-03-11 DIAGNOSIS — R73.03 PREDIABETES: ICD-10-CM

## 2024-03-11 DIAGNOSIS — E03.4 HYPOTHYROIDISM DUE TO ACQUIRED ATROPHY OF THYROID: ICD-10-CM

## 2024-03-11 DIAGNOSIS — E55.9 VITAMIN D INSUFFICIENCY: ICD-10-CM

## 2024-03-11 DIAGNOSIS — E78.2 MIXED HYPERLIPIDEMIA: ICD-10-CM

## 2024-03-11 PROCEDURE — 1123F ACP DISCUSS/DSCN MKR DOCD: CPT | Performed by: NURSE PRACTITIONER

## 2024-03-11 PROCEDURE — 99214 OFFICE O/P EST MOD 30 MIN: CPT | Performed by: NURSE PRACTITIONER

## 2024-03-11 RX ORDER — AMLODIPINE BESYLATE 2.5 MG/1
2.5 TABLET ORAL DAILY
Qty: 90 TABLET | Refills: 1 | Status: SHIPPED | OUTPATIENT
Start: 2024-03-11

## 2024-03-11 ASSESSMENT — ENCOUNTER SYMPTOMS
CONSTIPATION: 0
VOMITING: 0
DIARRHEA: 0
ABDOMINAL PAIN: 0
WHEEZING: 0
COUGH: 0
NAUSEA: 0
SHORTNESS OF BREATH: 0
SORE THROAT: 0

## 2024-03-11 NOTE — PROGRESS NOTES
(virtual) with the patient discussing the diagnosis and importance of compliance with the treatment plan as well as documenting on the day of the visit.    --FLAVIA Valladares - CNP

## 2024-04-08 ENCOUNTER — OFFICE VISIT (OUTPATIENT)
Age: 74
End: 2024-04-08
Payer: COMMERCIAL

## 2024-04-08 VITALS
HEIGHT: 71 IN | BODY MASS INDEX: 41.58 KG/M2 | HEART RATE: 78 BPM | WEIGHT: 297 LBS | DIASTOLIC BLOOD PRESSURE: 64 MMHG | SYSTOLIC BLOOD PRESSURE: 120 MMHG

## 2024-04-08 DIAGNOSIS — E78.2 MIXED HYPERLIPIDEMIA: ICD-10-CM

## 2024-04-08 DIAGNOSIS — G47.33 OSA (OBSTRUCTIVE SLEEP APNEA): ICD-10-CM

## 2024-04-08 DIAGNOSIS — I48.3 TYPICAL ATRIAL FLUTTER (HCC): Primary | ICD-10-CM

## 2024-04-08 DIAGNOSIS — I10 ESSENTIAL HYPERTENSION: ICD-10-CM

## 2024-04-08 PROCEDURE — 3074F SYST BP LT 130 MM HG: CPT | Performed by: INTERNAL MEDICINE

## 2024-04-08 PROCEDURE — 3078F DIAST BP <80 MM HG: CPT | Performed by: INTERNAL MEDICINE

## 2024-04-08 PROCEDURE — 93000 ELECTROCARDIOGRAM COMPLETE: CPT | Performed by: INTERNAL MEDICINE

## 2024-04-08 PROCEDURE — 99214 OFFICE O/P EST MOD 30 MIN: CPT | Performed by: INTERNAL MEDICINE

## 2024-04-08 PROCEDURE — 1123F ACP DISCUSS/DSCN MKR DOCD: CPT | Performed by: INTERNAL MEDICINE

## 2024-04-08 RX ORDER — LOSARTAN POTASSIUM 100 MG/1
100 TABLET ORAL DAILY
Qty: 90 TABLET | Refills: 3 | Status: SHIPPED | OUTPATIENT
Start: 2024-04-08

## 2024-04-08 NOTE — PROGRESS NOTES
[address] [facility], [place] [address]  PHONE: [phone]     24    NAME:  Hugo Bailey  : 1950  MRN: [id number]       SUBJECTIVE:   Hugo Bailey is a 73 y.o. male seen for a follow up visit regarding the following:     Chief Complaint   Patient presents with    Hypertension    Irregular Heart Beat       HPI:    19 A flutter ablation with Dr. Alva.     KORY 2019: normal EF.    KISHORE - not on CPAP now.    NST 2019 and 10/2020: normal, low risk      No new angina. No new BE, SOB.   BP better now.   No new palp now. On ASA now.    Never on statin.     Patient denies recent history of orthopnea, PND, excessive dizziness and/or syncope.          Past Medical History, Past Surgical History, Family history, Social History, and Medications were all reviewed with the patient today and updated as necessary.     Current Outpatient Medications   Medication Sig Dispense Refill    losartan (COZAAR) 100 MG tablet Take 1 tablet by mouth daily 90 tablet 3    levothyroxine (SYNTHROID) 200 MCG tablet Take 1 tablet by mouth every morning (before breakfast) 90 tablet 1    allopurinol (ZYLOPRIM) 300 MG tablet Take 1 tablet by mouth daily 90 tablet 1    aspirin 81 MG EC tablet Take by mouth daily      amLODIPine (NORVASC) 2.5 MG tablet Take 1 tablet by mouth daily (Patient not taking: Reported on 2024) 90 tablet 1     No current facility-administered medications for this visit.        Allergies   Allergen Reactions    Other Other (See Comments)     Wasp. Dizziness; syncopal...the patient tested states not allergic to wasp 21     Patient Active Problem List    Diagnosis Date Noted    Myopathy 2022     Priority: Medium    Axonal polyneuropathy 2022     Priority: Medium    Leg weakness, bilateral 2022     Priority: Medium    Numbness and tingling of both feet 2022     Priority: Medium    Gluteal pain 2022    Other disturbances of skin sensation 2022

## 2024-06-03 ENCOUNTER — OFFICE VISIT (OUTPATIENT)
Dept: NEUROLOGY | Age: 74
End: 2024-06-03
Payer: COMMERCIAL

## 2024-06-03 VITALS
BODY MASS INDEX: 41.58 KG/M2 | SYSTOLIC BLOOD PRESSURE: 166 MMHG | HEIGHT: 71 IN | WEIGHT: 297 LBS | DIASTOLIC BLOOD PRESSURE: 85 MMHG | HEART RATE: 77 BPM

## 2024-06-03 DIAGNOSIS — E03.4 HYPOTHYROIDISM DUE TO ACQUIRED ATROPHY OF THYROID: ICD-10-CM

## 2024-06-03 DIAGNOSIS — R20.0 NUMBNESS AND TINGLING OF BOTH FEET: ICD-10-CM

## 2024-06-03 DIAGNOSIS — G62.9 AXONAL POLYNEUROPATHY: Primary | ICD-10-CM

## 2024-06-03 DIAGNOSIS — R20.2 NUMBNESS AND TINGLING OF BOTH FEET: ICD-10-CM

## 2024-06-03 DIAGNOSIS — M25.551 RIGHT HIP PAIN: ICD-10-CM

## 2024-06-03 DIAGNOSIS — G47.33 OSA (OBSTRUCTIVE SLEEP APNEA): ICD-10-CM

## 2024-06-03 DIAGNOSIS — Z87.898 HISTORY OF ALCOHOL USE: ICD-10-CM

## 2024-06-03 PROBLEM — M79.18 GLUTEAL PAIN: Status: RESOLVED | Noted: 2022-04-25 | Resolved: 2024-06-03

## 2024-06-03 PROCEDURE — 99215 OFFICE O/P EST HI 40 MIN: CPT | Performed by: PSYCHIATRY & NEUROLOGY

## 2024-06-03 PROCEDURE — 1123F ACP DISCUSS/DSCN MKR DOCD: CPT | Performed by: PSYCHIATRY & NEUROLOGY

## 2024-06-03 PROCEDURE — 3077F SYST BP >= 140 MM HG: CPT | Performed by: PSYCHIATRY & NEUROLOGY

## 2024-06-03 PROCEDURE — 3079F DIAST BP 80-89 MM HG: CPT | Performed by: PSYCHIATRY & NEUROLOGY

## 2024-06-03 ASSESSMENT — ENCOUNTER SYMPTOMS
COUGH: 0
SORE THROAT: 0
BACK PAIN: 0
ABDOMINAL PAIN: 0
EYE PAIN: 0

## 2024-06-03 NOTE — PROGRESS NOTES
Mostly has tingling sensation in the feet and toes, perhaps exposure to hypothyroid state was also a contributory component to this neuropathy.  For painful symptoms, recommend continued follow-up with pain management and PCP.  He is considering a supplementation that contains alpha lipoic acid, explained that 300 mg to 600 mg daily can be effective and helpful, however it is not first-line.     Patient has also never had an official diagnosis of gout, has not had arthrocentesis/synovial fluid testing under microscope and has been on allopurinol despite no symptoms, consider lowering the dose or discontinuing.  Consider testing via rheumatology?       Return if symptoms worsen or fail to improve, for neuropathy follow-up.    Chon Mock MD  Epilepsy & Consultation Neurology  [facility]  [address] [facility], [address]  [place] [address]  Phone: [phone]    All medications and relevant precautions were fully reviewed with the patient. More than 50% of this visit was used to evaluate, educate and coordinate care for the patient for the above concerns. Total visit time: 42 minutes. Time includes pre- and post- face-to-face time, including record review of available pertinent images and reports. I have written all aspects of this note, parts of which were produced using speech recognition software, which may contain inadvertent errors in the produced words.

## 2024-06-24 ENCOUNTER — OFFICE VISIT (OUTPATIENT)
Age: 74
End: 2024-06-24
Payer: COMMERCIAL

## 2024-06-24 DIAGNOSIS — R20.0 NUMBNESS AND TINGLING OF BOTH FEET: ICD-10-CM

## 2024-06-24 DIAGNOSIS — M47.816 FACET ARTHROPATHY, LUMBAR: Primary | ICD-10-CM

## 2024-06-24 DIAGNOSIS — R20.2 NUMBNESS AND TINGLING OF BOTH FEET: ICD-10-CM

## 2024-06-24 DIAGNOSIS — M51.36 DDD (DEGENERATIVE DISC DISEASE), LUMBAR: ICD-10-CM

## 2024-06-24 PROCEDURE — 99203 OFFICE O/P NEW LOW 30 MIN: CPT | Performed by: PHYSICIAN ASSISTANT

## 2024-06-24 PROCEDURE — 1123F ACP DISCUSS/DSCN MKR DOCD: CPT | Performed by: PHYSICIAN ASSISTANT

## 2024-06-24 NOTE — PROGRESS NOTES
Name: Hugo Bailey  YOB: 1950  Gender: male  MRN: [id number]    CC: Back Problem       HPI: This is a 74 y.o. year old male who has diagnosis of sensorimotor axonal polyneuropathy.  He is followed by neurology.  In 2023, he was having some pain in the right hip right anterior thigh lateral hip and gluteal region.  EMG in 2022 did show sensorimotor axonal polyneuropathy but also some bilateral lower lumbar radiculopathy.  He had a pretty significant episode of right leg pain in 2023 that has resolved but based on that episode, he is referred to orthopedics to evaluate if this is referred from lumbar or hip.  Today patient states he is not having hip pain or leg pain.  He does have some difficulty with his balance and with walking.  He has no significant neck pain.  Not dropping objects.  He did attempt a lumbar MRI scan a couple of years ago but does need some sedation due to claustrophobia and it was not able to pursue that scan.  Overall at this time he feels that he is doing pretty well with the symptoms and does not feel like he needs any further treatment.                  ROS/Meds/PSH/PMH/FH/SH: I personally reviewed the patient's collected intake data.  Below are the pertinents:    Allergies   Allergen Reactions    Other Other (See Comments)     Wasp. Dizziness; syncopal...the patient tested states not allergic to wasp 8/2/21         Current Outpatient Medications:     losartan (COZAAR) 100 MG tablet, Take 1 tablet by mouth daily, Disp: 90 tablet, Rfl: 3    levothyroxine (SYNTHROID) 200 MCG tablet, Take 1 tablet by mouth every morning (before breakfast), Disp: 90 tablet, Rfl: 1    allopurinol (ZYLOPRIM) 300 MG tablet, Take 1 tablet by mouth daily, Disp: 90 tablet, Rfl: 1    aspirin 81 MG EC tablet, Take by mouth daily, Disp: , Rfl:     Past Surgical History:   Procedure Laterality Date    ABLATION OF DYSRHYTHMIC FOCUS  2019    COLONOSCOPY  4/2023    HEENT      lens implants    ORTHOPEDIC SURGERY

## 2024-08-09 SDOH — HEALTH STABILITY: PHYSICAL HEALTH: ON AVERAGE, HOW MANY DAYS PER WEEK DO YOU ENGAGE IN MODERATE TO STRENUOUS EXERCISE (LIKE A BRISK WALK)?: 0 DAYS

## 2024-08-11 NOTE — ASSESSMENT & PLAN NOTE
KORY from 5/17/2019 as follows:  -EF 55 to 60%  -No left atrial appendage thrombus or PFO  -Mild TR  Status post ablation with EP in May 2019  Nuclear stress test on 10/13/2020 as follows:  -Normal SPECT perfusion and LV systolic function  Continue aspirin, follow-up per cardiology

## 2024-08-11 NOTE — ASSESSMENT & PLAN NOTE
EMG/NCS from 8/22/2022 as follows:  -Sensorimotor axonal polyneuropathy moderate to severe in degree without demyelinating process  -Bilateral lower lumbar radiculopathy, mixed with active axonal loss from polyneuropathy  -Myopathic process recorded from iliopsoas  Neurology notes reviewed with axonal polyneuropathy thought to be secondary to prior alcohol consumption  Check B12 level, follow-up per neurology    Orders:    Vitamin B12; Future

## 2024-08-11 NOTE — PROGRESS NOTES
stasis hyperpigmentation with dry scaly skin of bilateral shins   Neurological:      Mental Status: He is alert.   Psychiatric:         Attention and Perception: Attention normal.         Mood and Affect: Mood and affect normal. Mood is not anxious or depressed. Affect is not tearful.         Speech: Speech normal. Speech is not rapid and pressured or slurred.         Behavior: Behavior normal. Behavior is not agitated, aggressive or combative. Behavior is cooperative.         Thought Content: Thought content normal.                  An electronic signature was used to authenticate this note.    --Quoc Hayes, DO

## 2024-08-11 NOTE — ASSESSMENT & PLAN NOTE
Continue losartan  Home blood pressure readings occasionally in the 130s systolic but typically in the 140s or higher  Discussed being aware of salt intake    Orders:    CBC with Auto Differential; Future    Comprehensive Metabolic Panel; Future

## 2024-08-12 ENCOUNTER — OFFICE VISIT (OUTPATIENT)
Dept: INTERNAL MEDICINE CLINIC | Facility: CLINIC | Age: 74
End: 2024-08-12
Payer: COMMERCIAL

## 2024-08-12 VITALS
OXYGEN SATURATION: 96 % | HEART RATE: 74 BPM | DIASTOLIC BLOOD PRESSURE: 86 MMHG | BODY MASS INDEX: 40.74 KG/M2 | WEIGHT: 291 LBS | TEMPERATURE: 97.7 F | HEIGHT: 71 IN | SYSTOLIC BLOOD PRESSURE: 150 MMHG

## 2024-08-12 DIAGNOSIS — I10 ESSENTIAL HYPERTENSION: ICD-10-CM

## 2024-08-12 DIAGNOSIS — G47.33 OBSTRUCTIVE SLEEP APNEA: ICD-10-CM

## 2024-08-12 DIAGNOSIS — E03.9 ACQUIRED HYPOTHYROIDISM: ICD-10-CM

## 2024-08-12 DIAGNOSIS — M1A.9XX0 CHRONIC GOUT WITHOUT TOPHUS, UNSPECIFIED CAUSE, UNSPECIFIED SITE: ICD-10-CM

## 2024-08-12 DIAGNOSIS — G62.9 AXONAL POLYNEUROPATHY: Primary | ICD-10-CM

## 2024-08-12 DIAGNOSIS — I48.3 TYPICAL ATRIAL FLUTTER (HCC): ICD-10-CM

## 2024-08-12 DIAGNOSIS — M47.816 FACET ARTHROPATHY, LUMBAR: ICD-10-CM

## 2024-08-12 DIAGNOSIS — R97.20 ELEVATED PSA: ICD-10-CM

## 2024-08-12 DIAGNOSIS — N40.0 BENIGN PROSTATIC HYPERPLASIA WITHOUT LOWER URINARY TRACT SYMPTOMS: ICD-10-CM

## 2024-08-12 DIAGNOSIS — E55.9 VITAMIN D DEFICIENCY: ICD-10-CM

## 2024-08-12 PROCEDURE — 3079F DIAST BP 80-89 MM HG: CPT | Performed by: STUDENT IN AN ORGANIZED HEALTH CARE EDUCATION/TRAINING PROGRAM

## 2024-08-12 PROCEDURE — 99214 OFFICE O/P EST MOD 30 MIN: CPT | Performed by: STUDENT IN AN ORGANIZED HEALTH CARE EDUCATION/TRAINING PROGRAM

## 2024-08-12 PROCEDURE — 3077F SYST BP >= 140 MM HG: CPT | Performed by: STUDENT IN AN ORGANIZED HEALTH CARE EDUCATION/TRAINING PROGRAM

## 2024-08-12 PROCEDURE — 1123F ACP DISCUSS/DSCN MKR DOCD: CPT | Performed by: STUDENT IN AN ORGANIZED HEALTH CARE EDUCATION/TRAINING PROGRAM

## 2024-08-12 RX ORDER — ALLOPURINOL 300 MG/1
300 TABLET ORAL DAILY
Qty: 90 TABLET | Refills: 2 | Status: SHIPPED | OUTPATIENT
Start: 2024-08-12

## 2024-08-12 RX ORDER — LEVOTHYROXINE SODIUM 0.2 MG/1
200 TABLET ORAL
Qty: 90 TABLET | Refills: 2 | Status: SHIPPED | OUTPATIENT
Start: 2024-08-12

## 2024-08-12 SDOH — ECONOMIC STABILITY: FOOD INSECURITY: WITHIN THE PAST 12 MONTHS, YOU WORRIED THAT YOUR FOOD WOULD RUN OUT BEFORE YOU GOT MONEY TO BUY MORE.: PATIENT DECLINED

## 2024-08-12 SDOH — ECONOMIC STABILITY: FOOD INSECURITY: WITHIN THE PAST 12 MONTHS, THE FOOD YOU BOUGHT JUST DIDN'T LAST AND YOU DIDN'T HAVE MONEY TO GET MORE.: PATIENT DECLINED

## 2024-08-12 SDOH — ECONOMIC STABILITY: INCOME INSECURITY: HOW HARD IS IT FOR YOU TO PAY FOR THE VERY BASICS LIKE FOOD, HOUSING, MEDICAL CARE, AND HEATING?: PATIENT DECLINED

## 2024-08-12 ASSESSMENT — ENCOUNTER SYMPTOMS
SHORTNESS OF BREATH: 0
BLOOD IN STOOL: 0
ANAL BLEEDING: 0

## 2024-08-12 ASSESSMENT — PATIENT HEALTH QUESTIONNAIRE - PHQ9
SUM OF ALL RESPONSES TO PHQ QUESTIONS 1-9: 0
2. FEELING DOWN, DEPRESSED OR HOPELESS: NOT AT ALL
SUM OF ALL RESPONSES TO PHQ QUESTIONS 1-9: 0
SUM OF ALL RESPONSES TO PHQ9 QUESTIONS 1 & 2: 0
SUM OF ALL RESPONSES TO PHQ QUESTIONS 1-9: 0
SUM OF ALL RESPONSES TO PHQ QUESTIONS 1-9: 0
1. LITTLE INTEREST OR PLEASURE IN DOING THINGS: NOT AT ALL

## 2024-10-14 ENCOUNTER — OFFICE VISIT (OUTPATIENT)
Age: 74
End: 2024-10-14
Payer: COMMERCIAL

## 2024-10-14 ENCOUNTER — OFFICE VISIT (OUTPATIENT)
Dept: UROLOGY | Age: 74
End: 2024-10-14
Payer: COMMERCIAL

## 2024-10-14 VITALS
HEIGHT: 71 IN | SYSTOLIC BLOOD PRESSURE: 172 MMHG | BODY MASS INDEX: 40.46 KG/M2 | DIASTOLIC BLOOD PRESSURE: 90 MMHG | HEART RATE: 64 BPM | WEIGHT: 289 LBS

## 2024-10-14 DIAGNOSIS — G47.33 OSA (OBSTRUCTIVE SLEEP APNEA): ICD-10-CM

## 2024-10-14 DIAGNOSIS — R31.21 ASYMPTOMATIC MICROSCOPIC HEMATURIA: ICD-10-CM

## 2024-10-14 DIAGNOSIS — I48.3 TYPICAL ATRIAL FLUTTER (HCC): Primary | ICD-10-CM

## 2024-10-14 DIAGNOSIS — I10 ESSENTIAL HYPERTENSION: ICD-10-CM

## 2024-10-14 DIAGNOSIS — N40.0 BPH WITHOUT OBSTRUCTION/LOWER URINARY TRACT SYMPTOMS: Primary | ICD-10-CM

## 2024-10-14 LAB
BILIRUBIN, URINE, POC: NEGATIVE
BLOOD URINE, POC: NORMAL
GLUCOSE URINE, POC: NEGATIVE MG/DL
KETONES, URINE, POC: NORMAL MG/DL
LEUKOCYTE ESTERASE, URINE, POC: NEGATIVE
NITRITE, URINE, POC: NEGATIVE
PH, URINE, POC: 5.5 (ref 4.6–8)
PROTEIN,URINE, POC: NORMAL MG/DL
SPECIFIC GRAVITY, URINE, POC: 1.02 (ref 1–1.03)
URINALYSIS CLARITY, POC: NORMAL
URINALYSIS COLOR, POC: NORMAL
UROBILINOGEN, POC: NORMAL MG/DL

## 2024-10-14 PROCEDURE — 3077F SYST BP >= 140 MM HG: CPT | Performed by: INTERNAL MEDICINE

## 2024-10-14 PROCEDURE — 1123F ACP DISCUSS/DSCN MKR DOCD: CPT | Performed by: UROLOGY

## 2024-10-14 PROCEDURE — 99214 OFFICE O/P EST MOD 30 MIN: CPT | Performed by: INTERNAL MEDICINE

## 2024-10-14 PROCEDURE — 1123F ACP DISCUSS/DSCN MKR DOCD: CPT | Performed by: INTERNAL MEDICINE

## 2024-10-14 PROCEDURE — 81003 URINALYSIS AUTO W/O SCOPE: CPT | Performed by: UROLOGY

## 2024-10-14 PROCEDURE — 99214 OFFICE O/P EST MOD 30 MIN: CPT | Performed by: UROLOGY

## 2024-10-14 PROCEDURE — 3079F DIAST BP 80-89 MM HG: CPT | Performed by: INTERNAL MEDICINE

## 2024-10-14 RX ORDER — AMLODIPINE BESYLATE 2.5 MG/1
TABLET ORAL
COMMUNITY

## 2024-10-14 ASSESSMENT — ENCOUNTER SYMPTOMS
BACK PAIN: 0
NAUSEA: 0

## 2024-10-14 NOTE — PROGRESS NOTES
[facility]  [address] [place] [address]  [phone]          Hugo Bailey  : 1950    Chief Complaint   Patient presents with    1 Year Follow Up          HPI     Hugo Bailey is a 74 y.o. male    PSA 4.2024 (free PSA 21.7%)  PSA 5.2023  PSA 5.2022  PSA 6.4 2020  PSA 3.2015  PSA 4.8 May 2013  PSA 6.21 in 2012    Patient is doing quite well.  He denies significant voiding symptoms.  There is been no gross hematuria or flank pain.  Apparently we may have done a hematuria workup on him about 10 years ago but those records are not available for me today.      Past Medical History:   Diagnosis Date    Arrhythmia 2006    LAST ECHO  ? APCs    Elevated PSA     Dr Childress     Gout     medication    Hematuria 2011    Pre op      History of hepatitis A     Hypertension     controlled with medication    Malaria 1973    Mixed hyperlipidemia     ascvd risk 20.5 in 2016    Morbid obesity     Osteoarthritis of hip 2011    LEFT TKA    Pre-diabetes     Sleep apnea 2019    new diagnosis - not received cpap yet    Thyroid disease     hypothyroidism - medication     Past Surgical History:   Procedure Laterality Date    ABLATION OF DYSRHYTHMIC FOCUS  2019    COLONOSCOPY  2023    HEENT      lens implants    JOINT REPLACEMENT      both hips    ORTHOPEDIC SURGERY  1965    left middle finger repair bone chip    PROSTATE BIOPSY, NEEDLE, SATURATION SAMPLING      TONSILLECTOMY      TOTAL HIP ARTHROPLASTY  2011    LEFT and right    VASECTOMY       Current Outpatient Medications   Medication Sig Dispense Refill    amLODIPine (NORVASC) 2.5 MG tablet       allopurinol (ZYLOPRIM) 300 MG tablet Take 1 tablet by mouth daily 90 tablet 2    levothyroxine (SYNTHROID) 200 MCG tablet Take 1 tablet by mouth every morning (before breakfast) 90 tablet 2    losartan (COZAAR) 100 MG tablet Take 1 tablet by mouth daily 90 tablet 3

## 2024-10-14 NOTE — PROGRESS NOTES
[address] [facility], [place] [address]  PHONE: [phone]     10/14/24    NAME:  Hugo Bailey  : 1950  MRN: [id number]       SUBJECTIVE:   Hugo Bailey is a 74 y.o. male seen for a follow up visit regarding the following:     Chief Complaint   Patient presents with    Atrial Flutter       HPI:   19 A flutter ablation with Dr. Alva.     KORY 2019: normal EF.    KISHORE - not on CPAP now.    NST 2019 and 10/2020: normal, low risk     Not taking meds regularly he feels now, traveling.    Back now.  Feeling well.  No CP.   No new angina. No new BE, SOB.   BP better now.   No new palp now. On ASA now.    Never on statin.     Patient denies recent history of orthopnea, PND, excessive dizziness and/or syncope.         Past Medical History, Past Surgical History, Family history, Social History, and Medications were all reviewed with the patient today and updated as necessary.     Current Outpatient Medications   Medication Sig Dispense Refill    allopurinol (ZYLOPRIM) 300 MG tablet Take 1 tablet by mouth daily 90 tablet 2    levothyroxine (SYNTHROID) 200 MCG tablet Take 1 tablet by mouth every morning (before breakfast) 90 tablet 2    losartan (COZAAR) 100 MG tablet Take 1 tablet by mouth daily 90 tablet 3    aspirin 81 MG EC tablet Take by mouth daily       No current facility-administered medications for this visit.        Allergies   Allergen Reactions    Other Other (See Comments)     Wasp. Dizziness; syncopal...the patient tested states not allergic to wasp 21     Patient Active Problem List    Diagnosis Date Noted    Myopathy 2022     Priority: Medium    Axonal polyneuropathy 2022     Priority: Medium    Leg weakness, bilateral 2022     Priority: Medium    Numbness and tingling of both feet 2022     Priority: Medium    History of alcohol use 2024    Other disturbances of skin sensation 2022    Prediabetes     Mixed hyperlipidemia     PLMD

## 2024-10-29 ENCOUNTER — HOSPITAL ENCOUNTER (OUTPATIENT)
Dept: ULTRASOUND IMAGING | Age: 74
Discharge: HOME | End: 2024-11-01
Payer: COMMERCIAL

## 2024-10-29 DIAGNOSIS — R31.21 ASYMPTOMATIC MICROSCOPIC HEMATURIA: ICD-10-CM

## 2024-10-29 PROCEDURE — 76770 US EXAM ABDO BACK WALL COMP: CPT

## 2024-11-11 ENCOUNTER — PROCEDURE VISIT (OUTPATIENT)
Dept: UROLOGY | Age: 74
End: 2024-11-11
Payer: COMMERCIAL

## 2024-11-11 DIAGNOSIS — N28.1 RENAL CYST: ICD-10-CM

## 2024-11-11 DIAGNOSIS — N40.0 BPH WITHOUT OBSTRUCTION/LOWER URINARY TRACT SYMPTOMS: Primary | ICD-10-CM

## 2024-11-11 DIAGNOSIS — R31.21 ASYMPTOMATIC MICROSCOPIC HEMATURIA: ICD-10-CM

## 2024-11-11 LAB
BILIRUBIN, URINE, POC: NEGATIVE
BLOOD URINE, POC: NORMAL
GLUCOSE URINE, POC: NEGATIVE MG/DL
KETONES, URINE, POC: NEGATIVE MG/DL
LEUKOCYTE ESTERASE, URINE, POC: NEGATIVE
NITRITE, URINE, POC: NEGATIVE
PH, URINE, POC: 5.5 (ref 4.6–8)
PROTEIN,URINE, POC: NORMAL MG/DL
SPECIFIC GRAVITY, URINE, POC: 1.02 (ref 1–1.03)
URINALYSIS CLARITY, POC: NORMAL
URINALYSIS COLOR, POC: NORMAL
UROBILINOGEN, POC: NORMAL MG/DL

## 2024-11-11 PROCEDURE — 81003 URINALYSIS AUTO W/O SCOPE: CPT | Performed by: UROLOGY

## 2024-11-11 PROCEDURE — 99214 OFFICE O/P EST MOD 30 MIN: CPT | Performed by: UROLOGY

## 2024-11-11 PROCEDURE — 52000 CYSTOURETHROSCOPY: CPT | Performed by: UROLOGY

## 2024-11-11 PROCEDURE — 1159F MED LIST DOCD IN RCRD: CPT | Performed by: UROLOGY

## 2024-11-11 PROCEDURE — 1123F ACP DISCUSS/DSCN MKR DOCD: CPT | Performed by: UROLOGY

## 2024-11-11 NOTE — PROGRESS NOTES
visit.    UA - Dipstick  Results for orders placed or performed in visit on 11/11/24   AMB POC URINALYSIS DIP STICK AUTO W/O MICRO   Result Value Ref Range    Color (UA POC)      Clarity (UA POC)      Glucose, Urine, POC Negative Negative mg/dL    Bilirubin, Urine, POC Negative Negative    KETONES, Urine, POC Negative Negative mg/dL    Specific Gravity, Urine, POC 1.025 1.001 - 1.035    Blood (UA POC) Moderate     pH, Urine, POC 5.5 4.6 - 8.0    Protein, Urine, POC Trace Negative mg/dL    Urobilinogen, POC 0.2 mg/dL <1.1 mg/dL    Nitrite, Urine, POC Negative Negative    Leukocyte Esterase, Urine, POC Negative Negative       UA - Micro  WBC - 0  RBC - 4-6  Bacteria - 0  Epith - 0          Cystoscopy Procedure:     Assistant:      SUSAN    All risks, benefits and alternatives were again reviewed with patient and he is willing to proceed at this time.  His genital area was prepped and draped and a sterile field applied. 2% lidocaine jelly was injected in the the urethra and allowed to dwell for several minutes.  A flexible cystoscope was then inserted into the urethral meatus and advanced under direct vision.  The anterior and posterior urethra appeared normal in appearance.  There was mild hypertrophy of the prostate with partial visual obstruction.  The bladder was systematically surveyed.  No bladder trabeculations were seen.  No mucosal abnormalities were seen.  The ureteral orifices were seen in their normal orthotopic position.  The cystoscope was then removed under direct vision.  The patient tolerated the procedure well.    Assessment and Plan    ICD-10-CM    1. BPH without obstruction/lower urinary tract symptoms  N40.0 AMB POC URINALYSIS DIP STICK AUTO W/O MICRO     CYSTOURETHROSCOPY      2. Asymptomatic microscopic hematuria  R31.21 AMB POC URINALYSIS DIP STICK AUTO W/O MICRO     CYSTOURETHROSCOPY      3. Renal cyst  N28.1 US RETROPERITONEAL COMPLETE        It would appear that the patient has at least 1

## 2024-12-04 DIAGNOSIS — R73.03 PREDIABETES: Primary | ICD-10-CM

## 2024-12-09 ENCOUNTER — LAB (OUTPATIENT)
Dept: INTERNAL MEDICINE CLINIC | Facility: CLINIC | Age: 74
End: 2024-12-09

## 2024-12-09 DIAGNOSIS — I10 ESSENTIAL HYPERTENSION: ICD-10-CM

## 2024-12-09 DIAGNOSIS — G62.9 AXONAL POLYNEUROPATHY: ICD-10-CM

## 2024-12-09 DIAGNOSIS — E03.9 ACQUIRED HYPOTHYROIDISM: ICD-10-CM

## 2024-12-09 DIAGNOSIS — E55.9 VITAMIN D DEFICIENCY: ICD-10-CM

## 2024-12-09 DIAGNOSIS — R73.03 PREDIABETES: ICD-10-CM

## 2024-12-09 LAB
25(OH)D3 SERPL-MCNC: 28.6 NG/ML (ref 30–100)
ALBUMIN SERPL-MCNC: 3.8 G/DL (ref 3.2–4.6)
ALBUMIN/GLOB SERPL: 1.5 (ref 1–1.9)
ALP SERPL-CCNC: 74 U/L (ref 40–129)
ALT SERPL-CCNC: 18 U/L (ref 8–55)
ANION GAP SERPL CALC-SCNC: 10 MMOL/L (ref 7–16)
AST SERPL-CCNC: 25 U/L (ref 15–37)
BASOPHILS # BLD: 0 K/UL (ref 0–0.2)
BASOPHILS NFR BLD: 1 % (ref 0–2)
BILIRUB SERPL-MCNC: 0.6 MG/DL (ref 0–1.2)
BUN SERPL-MCNC: 17 MG/DL (ref 8–23)
CALCIUM SERPL-MCNC: 8.9 MG/DL (ref 8.8–10.2)
CHLORIDE SERPL-SCNC: 106 MMOL/L (ref 98–107)
CHOLEST SERPL-MCNC: 180 MG/DL (ref 0–200)
CO2 SERPL-SCNC: 28 MMOL/L (ref 20–29)
CREAT SERPL-MCNC: 0.93 MG/DL (ref 0.8–1.3)
DIFFERENTIAL METHOD BLD: NORMAL
EOSINOPHIL # BLD: 0.3 K/UL (ref 0–0.8)
EOSINOPHIL NFR BLD: 4 % (ref 0.5–7.8)
ERYTHROCYTE [DISTWIDTH] IN BLOOD BY AUTOMATED COUNT: 13.3 % (ref 11.9–14.6)
EST. AVERAGE GLUCOSE BLD GHB EST-MCNC: 118 MG/DL
GLOBULIN SER CALC-MCNC: 2.6 G/DL (ref 2.3–3.5)
GLUCOSE SERPL-MCNC: 114 MG/DL (ref 70–99)
HBA1C MFR BLD: 5.7 % (ref 0–5.6)
HCT VFR BLD AUTO: 43.5 % (ref 41.1–50.3)
HDLC SERPL-MCNC: 40 MG/DL (ref 40–60)
HDLC SERPL: 4.5 (ref 0–5)
HGB BLD-MCNC: 14.3 G/DL (ref 13.6–17.2)
IMM GRANULOCYTES # BLD AUTO: 0.1 K/UL (ref 0–0.5)
IMM GRANULOCYTES NFR BLD AUTO: 1 % (ref 0–5)
LDLC SERPL CALC-MCNC: 110 MG/DL (ref 0–100)
LYMPHOCYTES # BLD: 1.3 K/UL (ref 0.5–4.6)
LYMPHOCYTES NFR BLD: 19 % (ref 13–44)
MCH RBC QN AUTO: 30.9 PG (ref 26.1–32.9)
MCHC RBC AUTO-ENTMCNC: 32.9 G/DL (ref 31.4–35)
MCV RBC AUTO: 94 FL (ref 82–102)
MONOCYTES # BLD: 0.5 K/UL (ref 0.1–1.3)
MONOCYTES NFR BLD: 7 % (ref 4–12)
NEUTS SEG # BLD: 4.7 K/UL (ref 1.7–8.2)
NEUTS SEG NFR BLD: 68 % (ref 43–78)
NRBC # BLD: 0 K/UL (ref 0–0.2)
PLATELET # BLD AUTO: 175 K/UL (ref 150–450)
PMV BLD AUTO: 9.9 FL (ref 9.4–12.3)
POTASSIUM SERPL-SCNC: 3.9 MMOL/L (ref 3.5–5.1)
PROT SERPL-MCNC: 6.4 G/DL (ref 6.3–8.2)
RBC # BLD AUTO: 4.63 M/UL (ref 4.23–5.6)
SODIUM SERPL-SCNC: 144 MMOL/L (ref 136–145)
T4 FREE SERPL-MCNC: 1.2 NG/DL (ref 0.9–1.7)
TRIGL SERPL-MCNC: 154 MG/DL (ref 0–150)
TSH, 3RD GENERATION: 6.57 UIU/ML (ref 0.27–4.2)
VIT B12 SERPL-MCNC: 631 PG/ML (ref 193–986)
VLDLC SERPL CALC-MCNC: 31 MG/DL (ref 6–23)
WBC # BLD AUTO: 6.8 K/UL (ref 4.3–11.1)

## 2024-12-15 NOTE — ASSESSMENT & PLAN NOTE
EMG/NCS from 8/22/2022 as follows:  -Sensorimotor axonal polyneuropathy moderate to severe in degree without demyelinating process  -Bilateral lower lumbar radiculopathy, mixed with active axonal loss from polyneuropathy  -Myopathic process recorded from iliopsoas  Neurology notes reviewed with axonal polyneuropathy thought to be secondary to prior alcohol consumption  Labs from 12/9/2024 with A1c in the prediabetic range of 5.7%, normal B12 level  Given increased fall risk in setting of neuropathy referral to physical therapy for balance training placed  Orders:    BS - Physical Therapy, [facility]

## 2024-12-15 NOTE — ASSESSMENT & PLAN NOTE
Continue losartan, minimize salt intake  Monitor home blood pressure regularly    Orders:    losartan (COZAAR) 100 MG tablet; Take 1 tablet by mouth daily

## 2024-12-15 NOTE — ASSESSMENT & PLAN NOTE
Lipid panel from 12/9/2024 with borderline triglycerides of 154, LDL of 110  Lifestyle changes encouraged

## 2024-12-16 ENCOUNTER — OFFICE VISIT (OUTPATIENT)
Dept: INTERNAL MEDICINE CLINIC | Facility: CLINIC | Age: 74
End: 2024-12-16
Payer: COMMERCIAL

## 2024-12-16 VITALS
OXYGEN SATURATION: 96 % | WEIGHT: 300.8 LBS | SYSTOLIC BLOOD PRESSURE: 128 MMHG | DIASTOLIC BLOOD PRESSURE: 82 MMHG | BODY MASS INDEX: 42.11 KG/M2 | HEIGHT: 71 IN | TEMPERATURE: 98.2 F | HEART RATE: 77 BPM

## 2024-12-16 DIAGNOSIS — I10 ESSENTIAL HYPERTENSION: ICD-10-CM

## 2024-12-16 DIAGNOSIS — E78.2 MIXED HYPERLIPIDEMIA: ICD-10-CM

## 2024-12-16 DIAGNOSIS — M1A.9XX0 CHRONIC GOUT WITHOUT TOPHUS, UNSPECIFIED CAUSE, UNSPECIFIED SITE: ICD-10-CM

## 2024-12-16 DIAGNOSIS — N40.1 BENIGN PROSTATIC HYPERPLASIA WITH LOWER URINARY TRACT SYMPTOMS, SYMPTOM DETAILS UNSPECIFIED: ICD-10-CM

## 2024-12-16 DIAGNOSIS — E03.9 ACQUIRED HYPOTHYROIDISM: ICD-10-CM

## 2024-12-16 DIAGNOSIS — R73.03 PREDIABETES: ICD-10-CM

## 2024-12-16 DIAGNOSIS — G62.9 AXONAL POLYNEUROPATHY: ICD-10-CM

## 2024-12-16 DIAGNOSIS — M47.816 FACET ARTHROPATHY, LUMBAR: ICD-10-CM

## 2024-12-16 DIAGNOSIS — E55.9 VITAMIN D DEFICIENCY: ICD-10-CM

## 2024-12-16 DIAGNOSIS — Z00.00 MEDICARE ANNUAL WELLNESS VISIT, SUBSEQUENT: Primary | ICD-10-CM

## 2024-12-16 DIAGNOSIS — R97.20 ELEVATED PSA: ICD-10-CM

## 2024-12-16 DIAGNOSIS — G47.33 OBSTRUCTIVE SLEEP APNEA: ICD-10-CM

## 2024-12-16 DIAGNOSIS — I48.3 TYPICAL ATRIAL FLUTTER (HCC): ICD-10-CM

## 2024-12-16 PROBLEM — Z87.898 HISTORY OF ALCOHOL USE: Status: RESOLVED | Noted: 2024-06-03 | Resolved: 2024-12-16

## 2024-12-16 PROCEDURE — 1160F RVW MEDS BY RX/DR IN RCRD: CPT | Performed by: STUDENT IN AN ORGANIZED HEALTH CARE EDUCATION/TRAINING PROGRAM

## 2024-12-16 PROCEDURE — 3074F SYST BP LT 130 MM HG: CPT | Performed by: STUDENT IN AN ORGANIZED HEALTH CARE EDUCATION/TRAINING PROGRAM

## 2024-12-16 PROCEDURE — 1159F MED LIST DOCD IN RCRD: CPT | Performed by: STUDENT IN AN ORGANIZED HEALTH CARE EDUCATION/TRAINING PROGRAM

## 2024-12-16 PROCEDURE — G0439 PPPS, SUBSEQ VISIT: HCPCS | Performed by: STUDENT IN AN ORGANIZED HEALTH CARE EDUCATION/TRAINING PROGRAM

## 2024-12-16 PROCEDURE — 3078F DIAST BP <80 MM HG: CPT | Performed by: STUDENT IN AN ORGANIZED HEALTH CARE EDUCATION/TRAINING PROGRAM

## 2024-12-16 PROCEDURE — 1123F ACP DISCUSS/DSCN MKR DOCD: CPT | Performed by: STUDENT IN AN ORGANIZED HEALTH CARE EDUCATION/TRAINING PROGRAM

## 2024-12-16 PROCEDURE — 1126F AMNT PAIN NOTED NONE PRSNT: CPT | Performed by: STUDENT IN AN ORGANIZED HEALTH CARE EDUCATION/TRAINING PROGRAM

## 2024-12-16 PROCEDURE — 99213 OFFICE O/P EST LOW 20 MIN: CPT | Performed by: STUDENT IN AN ORGANIZED HEALTH CARE EDUCATION/TRAINING PROGRAM

## 2024-12-16 RX ORDER — ALLOPURINOL 300 MG/1
300 TABLET ORAL DAILY
Qty: 90 TABLET | Refills: 2 | Status: SHIPPED | OUTPATIENT
Start: 2024-12-16

## 2024-12-16 RX ORDER — LOSARTAN POTASSIUM 100 MG/1
100 TABLET ORAL DAILY
Qty: 90 TABLET | Refills: 2 | Status: SHIPPED | OUTPATIENT
Start: 2024-12-16

## 2024-12-16 RX ORDER — LEVOTHYROXINE SODIUM 200 UG/1
200 TABLET ORAL
Qty: 90 TABLET | Refills: 2 | Status: SHIPPED | OUTPATIENT
Start: 2024-12-16

## 2024-12-16 RX ORDER — CHOLECALCIFEROL (VITAMIN D3) 25 MCG
2000 TABLET ORAL DAILY
COMMUNITY

## 2024-12-16 ASSESSMENT — ENCOUNTER SYMPTOMS
BACK PAIN: 0
ABDOMINAL PAIN: 0
NAUSEA: 0
VOMITING: 0
SHORTNESS OF BREATH: 0

## 2024-12-16 ASSESSMENT — PATIENT HEALTH QUESTIONNAIRE - PHQ9
2. FEELING DOWN, DEPRESSED OR HOPELESS: NOT AT ALL
SUM OF ALL RESPONSES TO PHQ QUESTIONS 1-9: 0
SUM OF ALL RESPONSES TO PHQ9 QUESTIONS 1 & 2: 0
SUM OF ALL RESPONSES TO PHQ QUESTIONS 1-9: 0
1. LITTLE INTEREST OR PLEASURE IN DOING THINGS: NOT AT ALL
SUM OF ALL RESPONSES TO PHQ QUESTIONS 1-9: 0
SUM OF ALL RESPONSES TO PHQ QUESTIONS 1-9: 0

## 2024-12-16 ASSESSMENT — LIFESTYLE VARIABLES
HOW OFTEN DO YOU HAVE A DRINK CONTAINING ALCOHOL: 2-4 TIMES A MONTH
HOW MANY STANDARD DRINKS CONTAINING ALCOHOL DO YOU HAVE ON A TYPICAL DAY: 1 OR 2

## 2024-12-16 NOTE — PROGRESS NOTES
Hugo Bailey (:  1950) is a 74 y.o. male,Established patient, here for evaluation of the following chief complaint(s):  Medicare AWV (Pt is here for his yearly AWV and lab review. )         Assessment & Plan  Medicare annual wellness visit, subsequent  Colonoscopy on 3/23/2023 with internal hemorrhoids, diverticulosis, transverse colon polyp (however pathology showed polypoid portion of benign colonic mucosa, negative for adenoma).  No further colonoscopy indicated per GI  PSA management per urology  Labs from 2024 reviewed in the office today  Medicare wellness responses reviewed in the office today         Axonal polyneuropathy  EMG/NCS from 2022 as follows:  -Sensorimotor axonal polyneuropathy moderate to severe in degree without demyelinating process  -Bilateral lower lumbar radiculopathy, mixed with active axonal loss from polyneuropathy  -Myopathic process recorded from iliopsoas  Neurology notes reviewed with axonal polyneuropathy thought to be secondary to prior alcohol consumption  Labs from 2024 with A1c in the prediabetic range of 5.7%, normal B12 level  Given increased fall risk in setting of neuropathy referral to physical therapy for balance training placed  Orders:    BS - Physical Therapy, [facility]    Facet arthropathy, lumbar  Radiographs from  per orthopedic spine notes shows multilevel facet arthropathy and degenerative changes without spondylolisthesis  Possibility for some degree of spinal stenosis at L4-5 contributing to radicular symptoms per specialist notes  Patient currently without significant pain, not interested in injections at this time per specialist notes  Prior attempts at lumbar MRI unsuccessful         Typical atrial flutter (HCC)  KORY from 2019 as follows:  -EF 55 to 60%  -No left atrial appendage thrombus or PFO  -Mild TR  Status post ablation with EP in May 2019  Nuclear stress test on 10/13/2020 as follows:  -Normal 
Sitting Sitting Sitting   Cuff Size: Large Adult Large Adult Large Adult   Pulse: 77     Temp: 98.2 °F (36.8 °C)     TempSrc: Temporal     SpO2: 96%     Weight: (!) 136.4 kg (300 lb 12.8 oz)     Height: 1.803 m (5' 11\")        Body mass index is 41.95 kg/m².                  No Known Allergies  Prior to Visit Medications    Medication Sig Taking? Authorizing Provider   vitamin D3 (CHOLECALCIFEROL) 25 MCG (1000 UT) TABS tablet Take 2 tablets by mouth daily Yes Provider, MD Donavon   allopurinol (ZYLOPRIM) 300 MG tablet Take 1 tablet by mouth daily Yes Quoc Hayes DO   levothyroxine (SYNTHROID) 200 MCG tablet Take 1 tablet by mouth every morning (before breakfast) Yes Quoc Hayes DO   losartan (COZAAR) 100 MG tablet Take 1 tablet by mouth daily Yes Quoc Hayes DO   aspirin 81 MG EC tablet Take by mouth daily Yes Automatic Reconciliation, Ar       CareTeam (Including outside providers/suppliers regularly involved in providing care):   Patient Care Team:  Quoc Hayes DO as PCP - General (Internal Medicine)  Quoc Hayes DO as PCP - Empaneled Provider      Reviewed and updated this visit:  Tobacco  Allergies  Meds  Problems  Med Hx  Surg Hx  Soc Hx  Fam Hx

## 2024-12-16 NOTE — PATIENT INSTRUCTIONS
Chest pain or pressure, or a strange feeling in the chest.     Sweating.     Shortness of breath.     Pain, pressure, or a strange feeling in the back, neck, jaw, or upper belly or in one or both shoulders or arms.     Lightheadedness or sudden weakness.     A fast or irregular heartbeat.   After you call 911, the  may tell you to chew 1 adult-strength or 2 to 4 low-dose aspirin. Wait for an ambulance. Do not try to drive yourself.  Watch closely for changes in your health, and be sure to contact your doctor if you have any problems.  Where can you learn more?  Go to https://www.[employer].net/patientEd and enter F075 to learn more about \"A Healthy Heart: Care Instructions.\"  Current as of: June 24, 2023  Content Version: 14.2  © 2024 [employer].   Care instructions adapted under license by [employer]. If you have questions about a medical condition or this instruction, always ask your healthcare professional. [employer] disclaims any warranty or liability for your use of this information.      Personalized Preventive Plan for Hugo Bailey - 12/16/2024  Medicare offers a range of preventive health benefits. Some of the tests and screenings are paid in full while other may be subject to a deductible, co-insurance, and/or copay.    Some of these benefits include a comprehensive review of your medical history including lifestyle, illnesses that may run in your family, and various assessments and screenings as appropriate.    After reviewing your medical record and screening and assessments performed today your provider may have ordered immunizations, labs, imaging, and/or referrals for you.  A list of these orders (if applicable) as well as your Preventive Care list are included within your After Visit Summary for your review.    Other Preventive Recommendations:    A preventive eye exam performed by an eye specialist is recommended every 1-2 years to screen for glaucoma; cataracts,

## 2025-01-23 ENCOUNTER — APPOINTMENT (OUTPATIENT)
Dept: CT IMAGING | Age: 75
End: 2025-01-23
Payer: COMMERCIAL

## 2025-01-23 ENCOUNTER — HOSPITAL ENCOUNTER (EMERGENCY)
Age: 75
Discharge: HOME | End: 2025-01-23
Payer: COMMERCIAL

## 2025-01-23 ENCOUNTER — APPOINTMENT (OUTPATIENT)
Dept: GENERAL RADIOLOGY | Age: 75
End: 2025-01-23
Payer: COMMERCIAL

## 2025-01-23 VITALS
BODY MASS INDEX: 42.23 KG/M2 | DIASTOLIC BLOOD PRESSURE: 74 MMHG | OXYGEN SATURATION: 98 % | RESPIRATION RATE: 18 BRPM | HEIGHT: 70 IN | TEMPERATURE: 99 F | WEIGHT: 295 LBS | SYSTOLIC BLOOD PRESSURE: 162 MMHG | HEART RATE: 77 BPM

## 2025-01-23 DIAGNOSIS — S81.811A LACERATION OF RIGHT LOWER EXTREMITY, INITIAL ENCOUNTER: ICD-10-CM
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
## 2025-01-23 DIAGNOSIS — S09.90XA INJURY OF HEAD, INITIAL ENCOUNTER: ICD-10-CM

## 2025-01-23 PROCEDURE — 12002 RPR S/N/AX/GEN/TRNK2.6-7.5CM: CPT

## 2025-01-23 PROCEDURE — 73070 X-RAY EXAM OF ELBOW: CPT

## 2025-01-23 PROCEDURE — 99284 EMERGENCY DEPT VISIT MOD MDM: CPT

## 2025-01-23 PROCEDURE — 70450 CT HEAD/BRAIN W/O DYE: CPT

## 2025-01-23 PROCEDURE — 72125 CT NECK SPINE W/O DYE: CPT

## 2025-01-23 PROCEDURE — 90714 TD VACC NO PRESV 7 YRS+ IM: CPT | Performed by: STUDENT IN AN ORGANIZED HEALTH CARE EDUCATION/TRAINING PROGRAM

## 2025-01-23 PROCEDURE — 90471 IMMUNIZATION ADMIN: CPT | Performed by: STUDENT IN AN ORGANIZED HEALTH CARE EDUCATION/TRAINING PROGRAM

## 2025-01-23 PROCEDURE — 73590 X-RAY EXAM OF LOWER LEG: CPT

## 2025-01-23 PROCEDURE — 6360000002 HC RX W HCPCS: Performed by: STUDENT IN AN ORGANIZED HEALTH CARE EDUCATION/TRAINING PROGRAM

## 2025-01-23 RX ADMIN — CLOSTRIDIUM TETANI TOXOID ANTIGEN (FORMALDEHYDE INACTIVATED) AND CORYNEBACTERIUM DIPHTHERIAE TOXOID ANTIGEN (FORMALDEHYDE INACTIVATED) 0.5 ML: 5; 2 INJECTION, SUSPENSION INTRAMUSCULAR at 20:22

## 2025-01-23 ASSESSMENT — ENCOUNTER SYMPTOMS
TROUBLE SWALLOWING: 0
ABDOMINAL PAIN: 0
PHOTOPHOBIA: 0
FACIAL SWELLING: 0
SHORTNESS OF BREATH: 0
COUGH: 0
VOMITING: 0

## 2025-01-23 ASSESSMENT — LIFESTYLE VARIABLES
HOW OFTEN DO YOU HAVE A DRINK CONTAINING ALCOHOL: MONTHLY OR LESS
HOW MANY STANDARD DRINKS CONTAINING ALCOHOL DO YOU HAVE ON A TYPICAL DAY: 1 OR 2

## 2025-01-23 ASSESSMENT — PAIN - FUNCTIONAL ASSESSMENT: PAIN_FUNCTIONAL_ASSESSMENT: NONE - DENIES PAIN

## 2025-01-23 ASSESSMENT — VISUAL ACUITY: OU: 1

## 2025-01-23 NOTE — ED TRIAGE NOTES
Pt presents via EMS after trip and fall around 1700.  Pt has laceration to right shin.  Pt denies hitting head/LOC.  Pt does not take thinners.    Laceration dressed by EMS; bleeding controlled in triage.

## 2025-01-24 NOTE — DISCHARGE INSTRUCTIONS
Your CT and x-ray imaging was benign today without signs of acute fracture.  You can use over-the-counter medications such as Tylenol and Motrin as needed for pain.  Return for new or worsening symptoms.    As we discussed, I did not find a life threatening cause of your symptoms today. However, THAT DOES NOT MEAN IT COULD NOT DEVELOP. If you develop ANY new or worsening symptoms, it is critical that you return for re-evaluation. This includes any symptoms that are concerning to you, especially symptoms such as inability to bear weight, fevers, worst headache of her life, extremity weakness or numbness.  If you do not return for re-evaluation, you risk serious complications, including death.

## 2025-01-24 NOTE — ED PROVIDER NOTES
Emergency Department Provider Note       PCP: Quoc Hayes DO   Age: 74 y.o.   Sex: male     DISPOSITION Decision To Discharge 01/23/2025 08:56:42 PM    ICD-10-CM    1. Fall, initial encounter  W19.XXXA       2. Injury of head, initial encounter  S09.90XA       3. Laceration of right lower extremity, initial encounter  S81.811A           Medical Decision Making     In summary this is a 74-year-old male patient who had a trip and fall with head injury and small laceration to his right anterior leg.  X-ray imaging benign.  Neurovascularly intact.  No signs of compartment syndrome.  Neurologically intact.  CT imaging grossly benign.  Wound was repaired with skin glue.  Do not feel antibiotics indicated.  Counseled on signs to return to the emergency department for.  The patient has verbalized understanding and agreed to the plan.  Discharged in stable condition.     1 acute, uncomplicated illness or injury.  Over the counter drug management performed.  Patient was discharged risks and benefits of hospitalization were considered.  Shared medical decision making was utilized in creating the patients health plan today.  I independently ordered and reviewed each unique test.  ED attending physician present in department at time of care. Based on current hospital policy, their co-signature is not required on this note.                Patient has minor blunt head trauma and head CT was ordered as patient is currently taking anticoagulant or antiplatelet medications.  Aspirin reported        History     74-year-old male patient with history of hypertension, hyperlipidemia presents today complaining of a fall that occurred about 2 hours ago.  Reports he was in his basement working on the water system when he tripped and fell.  States he has a laceration to his right shin and has a skin tear to his left elbow.  Reports he did not think he hit his head but noticed a bump on the right side of his head that hurts when he

## 2025-01-24 NOTE — ED NOTES
I have reviewed discharge instructions with the patient.  The patient verbalized understanding.    Patient left ED via Discharge Method: ambulatory to Home with family.    Opportunity for questions and clarification provided.       Patient given 0 scripts.         To continue your aftercare when you leave the hospital, you may receive an automated call from our care team to check in on how you are doing.  This is a free service and part of our promise to provide the best care and service to meet your aftercare needs.” If you have questions, or wish to unsubscribe from this service please call [phone].  Thank you for Choosing our [facility] Emergency Department.

## 2025-01-27 ENCOUNTER — OFFICE VISIT (OUTPATIENT)
Age: 75
End: 2025-01-27
Payer: COMMERCIAL

## 2025-01-27 VITALS
HEART RATE: 76 BPM | WEIGHT: 297.2 LBS | DIASTOLIC BLOOD PRESSURE: 72 MMHG | BODY MASS INDEX: 42.55 KG/M2 | SYSTOLIC BLOOD PRESSURE: 130 MMHG | HEIGHT: 70 IN

## 2025-01-27 DIAGNOSIS — E78.2 MIXED HYPERLIPIDEMIA: ICD-10-CM

## 2025-01-27 DIAGNOSIS — I48.3 TYPICAL ATRIAL FLUTTER (HCC): Primary | ICD-10-CM

## 2025-01-27 DIAGNOSIS — G47.33 OSA (OBSTRUCTIVE SLEEP APNEA): ICD-10-CM

## 2025-01-27 DIAGNOSIS — I10 ESSENTIAL HYPERTENSION: ICD-10-CM

## 2025-01-27 PROCEDURE — 99214 OFFICE O/P EST MOD 30 MIN: CPT | Performed by: INTERNAL MEDICINE

## 2025-01-27 PROCEDURE — 1126F AMNT PAIN NOTED NONE PRSNT: CPT | Performed by: INTERNAL MEDICINE

## 2025-01-27 PROCEDURE — 3078F DIAST BP <80 MM HG: CPT | Performed by: INTERNAL MEDICINE

## 2025-01-27 PROCEDURE — 1123F ACP DISCUSS/DSCN MKR DOCD: CPT | Performed by: INTERNAL MEDICINE

## 2025-01-27 PROCEDURE — 3075F SYST BP GE 130 - 139MM HG: CPT | Performed by: INTERNAL MEDICINE

## 2025-01-27 NOTE — PROGRESS NOTES
[address] [facility], [place] [address]  PHONE: [phone]     25    NAME:  Hugo Bailey  : 1950  MRN: [id number]       SUBJECTIVE:   Hugo Bailey is a 74 y.o. male seen for a follow up visit regarding the following:     Chief Complaint   Patient presents with    Hypertension    Follow-up       HPI:   19 A flutter ablation with Dr. Alva.     KORY 2019: normal EF.    KISHORE - not on CPAP now.    NST 2019 and 10/2020: normal, low risk     Back on ARB, BP better now.  No new angina.  Feeling well.  No CP.   No new angina. No new BE, SOB.   BP better now.   No new palp now. On ASA now.    Never on statin.     Patient denies recent history of orthopnea, PND, excessive dizziness and/or syncope.         Past Medical History, Past Surgical History, Family history, Social History, and Medications were all reviewed with the patient today and updated as necessary.     Current Outpatient Medications   Medication Sig Dispense Refill    vitamin D3 (CHOLECALCIFEROL) 25 MCG (1000 UT) TABS tablet Take 2 tablets by mouth daily      allopurinol (ZYLOPRIM) 300 MG tablet Take 1 tablet by mouth daily 90 tablet 2    levothyroxine (SYNTHROID) 200 MCG tablet Take 1 tablet by mouth every morning (before breakfast) 90 tablet 2    losartan (COZAAR) 100 MG tablet Take 1 tablet by mouth daily 90 tablet 2    aspirin 81 MG EC tablet Take by mouth daily       No current facility-administered medications for this visit.        No Known Allergies  Patient Active Problem List    Diagnosis Date Noted    Myopathy 2022     Priority: Medium    Axonal polyneuropathy 2022     Priority: Medium    Leg weakness, bilateral 2022     Priority: Medium    Numbness and tingling of both feet 2022     Priority: Medium    Other disturbances of skin sensation 2022    Prediabetes     Mixed hyperlipidemia     PLMD (periodic limb movement disorder) 2019    KISHORE (obstructive sleep apnea)

## 2025-02-10 ENCOUNTER — HOSPITAL ENCOUNTER (OUTPATIENT)
Dept: PHYSICAL THERAPY | Age: 75
Setting detail: RECURRING SERIES
Discharge: HOME | End: 2025-02-13
Payer: COMMERCIAL

## 2025-02-10 DIAGNOSIS — R26.89 BALANCE DISORDER: ICD-10-CM

## 2025-02-10 DIAGNOSIS — M62.81 MUSCLE WEAKNESS (GENERALIZED): Primary | ICD-10-CM

## 2025-02-10 DIAGNOSIS — R26.2 DIFFICULTY IN WALKING, NOT ELSEWHERE CLASSIFIED: ICD-10-CM

## 2025-02-10 PROCEDURE — 97162 PT EVAL MOD COMPLEX 30 MIN: CPT

## 2025-02-10 PROCEDURE — 97110 THERAPEUTIC EXERCISES: CPT

## 2025-02-10 ASSESSMENT — PAIN SCALES - GENERAL: PAINLEVEL_OUTOF10: 0

## 2025-02-10 NOTE — THERAPY EVALUATION
Hugo Bailey  : 1950  Primary: [employer] (Medicare Managed)  Secondary:  [facility] @ [address]  [facility] [address]  Phone: [phone]  Fax: [phone] Plan Frequency: 2x/wk for 90 days    Plan of Care/Certification Expiration Date: 25        Plan of Care/Certification Expiration Date:  Plan of Care/Certification Expiration Date: 25    Frequency/Duration: Plan Frequency: 2x/wk for 90 days      Time In/Out:   Time In: 1345  Time Out: 1430      PT Visit Info:    Progress Note Due Date: 25  Total # of Visits to Date: 1  Progress Note Counter: 1      Visit Count:  1                OUTPATIENT PHYSICAL THERAPY:             Initial Assessment 2/10/2025               Episode (PT: Axonal Polyneuropathy)         Treatment Diagnosis:     Muscle weakness (generalized)  Difficulty in walking, not elsewhere classified  Balance disorder  Medical/Referring Diagnosis:    Axonal polyneuropathy    Referring Physician:  Quoc Hayes DO MD Orders:  PT Eval and Treat   Return MD Appt:  25  Date of Onset:    Chronic LE weakness and decreased balance  Allergies:  Patient has no known allergies.  Restrictions/Precautions:    None      Medications Last Reviewed: 2/10/2025     SUBJECTIVE   History of Injury/Illness (Reason for Referral):  Pt reports feeling unsteady when standing/walking on uneven surfaces.  He states his last fall was 2 weeks ago.  He states he was trying to get water turned off because a pipe burst and when he turned he tripped over a basket and fell.  He reports falling approximately 4 times over the past 3 years.  He reports bilateral LE weakness also.  He lives with his wife in a one-story house with a basement.  Pt reports difficulties ascending/descending stairs due to his LE weakness and decreased balance.  Patient Stated Goal(s):  to improve balance and prevent falls  Initial Pain Level:      0/10   Post Session

## 2025-02-10 NOTE — PROGRESS NOTES
Hugo Bailey  : 1950  Primary: [employer] (Medicare Managed)  Secondary:  [facility] @ [address]  [facility] [address]  Phone: [phone]  Fax: [phone] Plan Frequency: 2x/wk for 90 days  Plan of Care/Certification Expiration Date: 25        Plan of Care/Certification Expiration Date:  Plan of Care/Certification Expiration Date: 25    Frequency/Duration: Plan Frequency: 2x/wk for 90 days      Time In/Out:   Time In: 1345  Time Out: 1430      PT Visit Info:    Progress Note Due Date: 25  Total # of Visits to Date: 1  Progress Note Counter: 1      Visit Count:  1    OUTPATIENT PHYSICAL THERAPY:   Treatment Note 2/10/2025       Episode  (PT: Axonal Polyneuropathy)               Treatment Diagnosis:    Muscle weakness (generalized)  Difficulty in walking, not elsewhere classified  Balance disorder  Medical/Referring Diagnosis:    Axonal polyneuropathy    Referring Physician:  Quoc Hayes DO MD Orders:  PT Eval and Treat   Return MD Appt:  25   Date of Onset:  Chronic LE weakness and decreased balance  Allergies:   Patient has no known allergies.  Restrictions/Precautions:   None      Interventions Planned (Treatment may consist of any combination of the following):     See Assessment Note    Subjective Comments:   Pt reports LE weakness and decreased balance  Initial Pain Level:     0/10  Post Session Pain Level:      0/10  Medications Last Reviewed: 2/10/2025  Updated Objective Findings:  See Evaluation Note from today  Treatment   THERAPEUTIC EXERCISE: (10 minutes):    Exercises per grid below to improve mobility and strength.  Required minimal verbal cues to promote proper body alignment and promote proper body mechanics.  Progressed resistance and repetitions as indicated.   Date:  02-10-25 Date:   Date:     Activity/Exercise Parameters Parameters Parameters   Seated Heel/Toe Raises 15 reps  B LE's  (HEP)

## 2025-02-17 ENCOUNTER — HOSPITAL ENCOUNTER (OUTPATIENT)
Dept: PHYSICAL THERAPY | Age: 75
Setting detail: RECURRING SERIES
Discharge: HOME | End: 2025-02-20
Payer: COMMERCIAL

## 2025-02-17 PROCEDURE — 97110 THERAPEUTIC EXERCISES: CPT

## 2025-02-17 ASSESSMENT — PAIN SCALES - GENERAL: PAINLEVEL_OUTOF10: 0

## 2025-02-17 NOTE — PROGRESS NOTES
Hugo Bailey  : 1950  Primary: [employer] (Medicare Managed)  Secondary:  [facility] @ [address]  [facility] [address]  Phone: [phone]  Fax: [phone] Plan Frequency: 2x/wk for 90 days  Plan of Care/Certification Expiration Date: 25        Plan of Care/Certification Expiration Date:  Plan of Care/Certification Expiration Date: 25    Frequency/Duration: Plan Frequency: 2x/wk for 90 days      Time In/Out:   Time In: 1305  Time Out: 1345      PT Visit Info:    Progress Note Due Date: 25  Total # of Visits to Date: 2  Progress Note Counter: 2      Visit Count:  2    OUTPATIENT PHYSICAL THERAPY:   Treatment Note 2025       Episode  (PT: Axonal Polyneuropathy)               Treatment Diagnosis:    Muscle weakness (generalized)  Difficulty in walking, not elsewhere classified  Balance disorder  Medical/Referring Diagnosis:    Axonal polyneuropathy    Referring Physician:  Quoc Hayes DO MD Orders:  PT Eval and Treat   Return MD Appt:  25   Date of Onset:  Chronic LE weakness and decreased balance  Allergies:   Patient has no known allergies.  Restrictions/Precautions:   None      Interventions Planned (Treatment may consist of any combination of the following):     See Assessment Note    Subjective Comments:   Pt states he is feeling ok today.  Initial Pain Level:     0/10  Post Session Pain Level:      0/10  Medications Last Reviewed: 2025  Updated Objective Findings:  None Today  Treatment   THERAPEUTIC EXERCISE: (40 minutes):    Exercises per grid below to improve mobility and strength.  Required minimal verbal cues to promote proper body alignment and promote proper body mechanics.  Progressed resistance and repetitions as indicated.   Date:  02-10-25 Date:  25 Date:     Activity/Exercise Parameters Parameters Parameters   Seated Heel/Toe Raises 15 reps  B LE's  (HEP) 20 reps  B LE's    Standing

## 2025-02-24 ENCOUNTER — HOSPITAL ENCOUNTER (OUTPATIENT)
Dept: PHYSICAL THERAPY | Age: 75
Setting detail: RECURRING SERIES
Discharge: HOME | End: 2025-02-27
Payer: COMMERCIAL

## 2025-02-24 PROCEDURE — 97110 THERAPEUTIC EXERCISES: CPT

## 2025-02-24 ASSESSMENT — PAIN SCALES - GENERAL: PAINLEVEL_OUTOF10: 0

## 2025-03-03 ENCOUNTER — HOSPITAL ENCOUNTER (OUTPATIENT)
Dept: PHYSICAL THERAPY | Age: 75
Setting detail: RECURRING SERIES
Discharge: HOME | End: 2025-03-06
Payer: COMMERCIAL

## 2025-03-03 PROCEDURE — 97110 THERAPEUTIC EXERCISES: CPT

## 2025-03-03 ASSESSMENT — PAIN SCALES - GENERAL: PAINLEVEL_OUTOF10: 0

## 2025-03-03 NOTE — PROGRESS NOTES
Standing Marching 20 reps  B LE's 20 reps  B LE's 1 Lb  20 reps  B LE's   Standing Hip Abduction 20 reps  B LE's 20 reps  B LE's 1 Lb  20 reps  B LE's   Standing Hip Extension 20 reps  B LE's 20 reps  B LE's 1 lb  20 reps  B LE's   NuStep Level 3  6 minutes Level 3  7 minutes Level 3  7 minutes   Standing Knee Flexion 20 reps  B LE's 20 reps  B LE's 1 Lb  20 reps  B LE's   Alternating Toe Taps on Step 6 inch step  15 reps  B LE's 6 inch step  20 reps  B LE's 6 inch step  20 reps  B LE's   Step-Ups 6 inch step  10 reps  B LE's 6 inch step  10 reps  B LE's 6 inch step  10 reps  B LE's   Tandem Stance on Blue Foams Eyes Open and Closed Eyes Open and Closed Eyes Open and Closed   Sit to Stands 10 reps  No UE Assist  10 reps  No UE Assist   LAQ's 20 reps  B LE's  1 Lb  20 reps  B LE's   Bridging      Theraband Clam Shells in Hook Lying          Treatment/Session Summary:    Treatment Assessment:   Pt tolerated treatments well today with no c/o.  He did well with addition of 1 Lb weight on standing exercises and LAQ's today.  Communication/Consultation:  None today  Equipment provided today:  None  Recommendations/Intent for next treatment session: Next visit will focus on progression of LE strengthening exercises as tolerable, balance training.    >Total Treatment Billable Duration:  40 minutes   Time In: 1305  Time Out: 1345     Juwan Feliciano PT         Charge Capture  Events  [employer] Portal  Appt Desk  Attendance Report     Future Appointments   Date Time Provider Department Center   3/10/2025  1:00 PM Juwan Feliciano PT SFEORPT [facility]   4/28/2025 11:00 AM Quoc Hayes DO MAT BS ECC DEP   5/6/2025  9:00 AM Roby Galindo, APRN - CNP PSCD [facility] AMB   7/21/2025  2:30 PM Jean-Pierre Olsen DO UCDG [facility] AMB   11/10/2025  2:30 PM Boogie Ferris MD PGUMAU [facility] AMB

## 2025-03-10 ENCOUNTER — HOSPITAL ENCOUNTER (OUTPATIENT)
Dept: PHYSICAL THERAPY | Age: 75
Setting detail: RECURRING SERIES
Discharge: HOME | End: 2025-03-13
Payer: COMMERCIAL

## 2025-03-10 PROCEDURE — 97110 THERAPEUTIC EXERCISES: CPT

## 2025-03-10 ASSESSMENT — PAIN SCALES - GENERAL: PAINLEVEL_OUTOF10: 0

## 2025-03-10 NOTE — PROGRESS NOTES
Hugo Bailey  : 1950  Primary: [employer] (Medicare Managed)  Secondary:  [facility] @ [address]  [facility] [address]  Phone: [phone]  Fax: [phone] Plan Frequency: 2x/wk for 90 days  Plan of Care/Certification Expiration Date: 25        Plan of Care/Certification Expiration Date:  Plan of Care/Certification Expiration Date: 25    Frequency/Duration: Plan Frequency: 2x/wk for 90 days      Time In/Out:   Time In: 1311  Time Out: 1349      PT Visit Info:    Progress Note Due Date: 25  Total # of Visits to Date: 5  Progress Note Counter: 5      Visit Count:  5    OUTPATIENT PHYSICAL THERAPY:   Treatment Note 3/10/2025       Episode  (PT: Axonal Polyneuropathy)               Treatment Diagnosis:    Muscle weakness (generalized)  Difficulty in walking, not elsewhere classified  Balance disorder  Medical/Referring Diagnosis:    Axonal polyneuropathy    Referring Physician:  Quoc Hayes DO MD Orders:  PT Eval and Treat   Return MD Appt:  25   Date of Onset:  Chronic LE weakness and decreased balance  Allergies:   Patient has no known allergies.  Restrictions/Precautions:   None      Interventions Planned (Treatment may consist of any combination of the following):     See Assessment Note    Subjective Comments:   Pt states he is feeling ok today.  Initial Pain Level:     0/10  Post Session Pain Level:      0/10  Medications Last Reviewed: 3/10/2025  Updated Objective Findings:  None Today  Treatment   THERAPEUTIC EXERCISE: (38 minutes):    Exercises per grid below to improve mobility and strength.  Required minimal verbal cues to promote proper body alignment and promote proper body mechanics.  Progressed resistance and repetitions as indicated.   Date:  25 Date:  25 Date:  03-10-25   Activity/Exercise Parameters     Seated Heel/Toe Raises 20 reps  B LE's 20 reps  B LE's 20 reps  B LE's   Standing Marching

## 2025-03-24 ENCOUNTER — HOSPITAL ENCOUNTER (OUTPATIENT)
Dept: PHYSICAL THERAPY | Age: 75
Setting detail: RECURRING SERIES
End: 2025-03-24
Payer: COMMERCIAL

## 2025-03-31 ENCOUNTER — HOSPITAL ENCOUNTER (OUTPATIENT)
Dept: PHYSICAL THERAPY | Age: 75
Setting detail: RECURRING SERIES
Discharge: HOME | End: 2025-04-03
Payer: COMMERCIAL

## 2025-03-31 PROCEDURE — 97110 THERAPEUTIC EXERCISES: CPT

## 2025-03-31 ASSESSMENT — PAIN SCALES - GENERAL: PAINLEVEL_OUTOF10: 0

## 2025-03-31 NOTE — PROGRESS NOTES
Hugo Bailey  : 1950  Primary: [employer] (Medicare Managed)  Secondary:  [facility] @ [address]  [facility] [address]  Phone: [phone]  Fax: [phone] Plan Frequency: 2x/wk for 90 days  Plan of Care/Certification Expiration Date: 25        Plan of Care/Certification Expiration Date:  Plan of Care/Certification Expiration Date: 25    Frequency/Duration: Plan Frequency: 2x/wk for 90 days      Time In/Out:   Time In: 1315  Time Out: 1400      PT Visit Info:    Progress Note Due Date: 25  Total # of Visits to Date: 6  Progress Note Counter: 1      Visit Count:  6    OUTPATIENT PHYSICAL THERAPY:   Treatment Note 3/31/2025       Episode  (PT: Axonal Polyneuropathy)               Treatment Diagnosis:    Muscle weakness (generalized)  Difficulty in walking, not elsewhere classified  Balance disorder  Medical/Referring Diagnosis:    Axonal polyneuropathy    Referring Physician:  Quoc Hayes DO MD Orders:  PT Eval and Treat   Return MD Appt:  25   Date of Onset:  Chronic LE weakness and decreased balance  Allergies:   Patient has no known allergies.  Restrictions/Precautions:   None      Interventions Planned (Treatment may consist of any combination of the following):     See Assessment Note    Subjective Comments:   Pt states he is feeling ok today.  Initial Pain Level:     0/10  Post Session Pain Level:      0/10  Medications Last Reviewed: 3/31/2025  Updated Objective Findings:  None Today  Treatment   THERAPEUTIC EXERCISE: (40 minutes):    Exercises per grid below to improve mobility and strength.  Required minimal verbal cues to promote proper body alignment and promote proper body mechanics.  Progressed resistance and repetitions as indicated.   Date:  25 Date:  03-10-25 Date:  25   Activity/Exercise      Seated Heel/Toe Raises 20 reps  B LE's 20 reps  B LE's 20 reps  B LE's   Standing Marching 1 Lb  20

## 2025-03-31 NOTE — PROGRESS NOTES
Hugo Bailey  : 1950  Primary: [employer] (Medicare Managed)  Secondary:  [facility] @ [address]  [facility] [address]  Phone: [phone]  Fax: [phone] Plan Frequency: 2x/wk for 90 days    Plan of Care/Certification Expiration Date: 25        Plan of Care/Certification Expiration Date:  Plan of Care/Certification Expiration Date: 25    Frequency/Duration: Plan Frequency: 2x/wk for 90 days      Time In/Out:   Time In: 1315  Time Out: 1400      PT Visit Info:    Progress Note Due Date: 25  Total # of Visits to Date: 6  Progress Note Counter: 1      Visit Count:  6                OUTPATIENT PHYSICAL THERAPY:             Progress Report 3/31/2025               Episode (PT: Axonal Polyneuropathy)         Treatment Diagnosis:     Muscle weakness (generalized)  Difficulty in walking, not elsewhere classified  Balance disorder  Medical/Referring Diagnosis:    Axonal polyneuropathy    Referring Physician:  Quoc Hayes DO MD Orders:  PT Eval and Treat   Return MD Appt:  25  Date of Onset:    Chronic LE weakness and decreased balance  Allergies:  Patient has no known allergies.  Restrictions/Precautions:    None      Medications Last Reviewed: 3/31/2025     SUBJECTIVE   History of Injury/Illness (Reason for Referral):  Pt reports feeling unsteady when standing/walking on uneven surfaces.  He states his last fall was 2 weeks ago.  He states he was trying to get water turned off because a pipe burst and when he turned he tripped over a basket and fell.  He reports falling approximately 4 times over the past 3 years.  He reports bilateral LE weakness also.  He lives with his wife in a one-story house with a basement.  Pt reports difficulties ascending/descending stairs due to his LE weakness and decreased balance.  Patient Stated Goal(s):  to improve balance and prevent falls  Initial Pain Level:      0/10   Post Session

## 2025-04-07 ENCOUNTER — HOSPITAL ENCOUNTER (OUTPATIENT)
Dept: PHYSICAL THERAPY | Age: 75
Setting detail: RECURRING SERIES
Discharge: HOME | End: 2025-04-10
Payer: COMMERCIAL

## 2025-04-07 PROCEDURE — 97110 THERAPEUTIC EXERCISES: CPT

## 2025-04-07 NOTE — PROGRESS NOTES
Hugo Bailey  : 1950  Primary: [employer] (Medicare Managed)  Secondary:  [facility] @ [address]  [facility] [address]  Phone: [phone]  Fax: [phone] Plan Frequency: 2x/wk for 90 days  Plan of Care/Certification Expiration Date: 25        Plan of Care/Certification Expiration Date:  Plan of Care/Certification Expiration Date: 25    Frequency/Duration: Plan Frequency: 2x/wk for 90 days      Time In/Out:   Time In: 1315  Time Out: 1345      PT Visit Info:    Progress Note Due Date: 25  Total # of Visits to Date: 7  Progress Note Counter: 2      Visit Count:  7    OUTPATIENT PHYSICAL THERAPY:   Treatment Note 2025       Episode  (PT: Axonal Polyneuropathy)               Treatment Diagnosis:    Muscle weakness (generalized)  Difficulty in walking, not elsewhere classified  Balance disorder  Medical/Referring Diagnosis:    Axonal polyneuropathy    Referring Physician:  Quoc Hayes DO MD Orders:  PT Eval and Treat   Return MD Appt:  25   Date of Onset:  Chronic LE weakness and decreased balance  Allergies:   Patient has no known allergies.  Restrictions/Precautions:   None      Interventions Planned (Treatment may consist of any combination of the following):     See Assessment Note    Subjective Comments:   Pt states he is feeling ok today.  Initial Pain Level:      0/10  Post Session Pain Level:       0/10  Medications Last Reviewed: 2025  Updated Objective Findings:  None Today  Treatment   THERAPEUTIC EXERCISE: (30 minutes):    Exercises per grid below to improve mobility and strength.  Required minimal verbal cues to promote proper body alignment and promote proper body mechanics.  Progressed resistance and repetitions as indicated.   Date:  03-10-25 Date:  25 Date:  25   Activity/Exercise      Seated Heel/Toe Raises 20 reps  B LE's 20 reps  B LE's 20 reps  B LE's   Standing Marching 1 Lb  20

## 2025-04-15 ENCOUNTER — HOSPITAL ENCOUNTER (OUTPATIENT)
Dept: PHYSICAL THERAPY | Age: 75
Setting detail: RECURRING SERIES
Discharge: HOME | End: 2025-04-18
Payer: COMMERCIAL

## 2025-04-15 PROCEDURE — 97110 THERAPEUTIC EXERCISES: CPT

## 2025-04-15 NOTE — PROGRESS NOTES
Hugo Bailey  : 1950  Primary: [employer] (Medicare Managed)  Secondary:  [facility] @ [address]  [facility] [address]  Phone: [phone]  Fax: [phone] Plan Frequency: 2x/wk for 90 days  Plan of Care/Certification Expiration Date: 25        Plan of Care/Certification Expiration Date:  Plan of Care/Certification Expiration Date: 25    Frequency/Duration: Plan Frequency: 2x/wk for 90 days      Time In/Out:   Time In: 1122  Time Out: 1200      PT Visit Info:    Progress Note Due Date: 25  Total # of Visits to Date: 8  Progress Note Counter: 3      Visit Count:  8    OUTPATIENT PHYSICAL THERAPY:   Treatment Note 4/15/2025       Episode  (PT: Axonal Polyneuropathy)               Treatment Diagnosis:    Muscle weakness (generalized)  Difficulty in walking, not elsewhere classified  Balance disorder  Medical/Referring Diagnosis:    Axonal polyneuropathy    Referring Physician:  Quoc Hayes DO MD Orders:  PT Eval and Treat   Return MD Appt:  25   Date of Onset:  Chronic LE weakness and decreased balance  Allergies:   Patient has no known allergies.  Restrictions/Precautions:   None      Interventions Planned (Treatment may consist of any combination of the following):     See Assessment Note    Subjective Comments:   Pt states he is feeling ok today.  He states he can tell his strength and balance are improving.  Initial Pain Level:      0/10  Post Session Pain Level:       0/10  Medications Last Reviewed: 4/15/2025  Updated Objective Findings:  None Today  Treatment   THERAPEUTIC EXERCISE: (38 minutes):    Exercises per grid below to improve mobility and strength.  Required minimal verbal cues to promote proper body alignment and promote proper body mechanics.  Progressed resistance and repetitions as indicated.   Date:  25 Date:  25 Date:  04-15-25   Activity/Exercise      Seated Heel/Toe Raises 20 reps  B

## 2025-04-21 ENCOUNTER — HOSPITAL ENCOUNTER (OUTPATIENT)
Dept: PHYSICAL THERAPY | Age: 75
Setting detail: RECURRING SERIES
Discharge: HOME | End: 2025-04-24
Payer: COMMERCIAL

## 2025-04-21 PROCEDURE — 97110 THERAPEUTIC EXERCISES: CPT

## 2025-04-21 ASSESSMENT — PAIN SCALES - GENERAL: PAINLEVEL_OUTOF10: 0

## 2025-04-21 NOTE — PROGRESS NOTES
Hugo Bailey  : 1950  Primary: [employer] (Medicare Managed)  Secondary:  [facility] @ [address]  [facility] [address]  Phone: [phone]  Fax: [phone] Plan Frequency: 2x/wk for 90 days  Plan of Care/Certification Expiration Date: 25        Plan of Care/Certification Expiration Date:  Plan of Care/Certification Expiration Date: 25    Frequency/Duration: Plan Frequency: 2x/wk for 90 days      Time In/Out:   Time In: 1302  Time Out: 1347      PT Visit Info:    Progress Note Due Date: 25  Total # of Visits to Date: 9  Progress Note Counter: 4      Visit Count:  9    OUTPATIENT PHYSICAL THERAPY:   Treatment Note 2025       Episode  (PT: Axonal Polyneuropathy)               Treatment Diagnosis:    Muscle weakness (generalized)  Difficulty in walking, not elsewhere classified  Balance disorder  Medical/Referring Diagnosis:    Axonal polyneuropathy    Referring Physician:  Quoc Hayes DO MD Orders:  PT Eval and Treat   Return MD Appt:  25   Date of Onset:  Chronic LE weakness and decreased balance  Allergies:   Patient has no known allergies.  Restrictions/Precautions:   None      Interventions Planned (Treatment may consist of any combination of the following):     See Assessment Note    Subjective Comments:   No falls from standing, but rolled out of bed and have some bruises.    Initial Pain Level:     0/10  Post Session Pain Level:      0/10  Medications Last Reviewed: 2025  Updated Objective Findings:  None Today  Treatment   THERAPEUTIC EXERCISE: (45 minutes):    Exercises per grid below to improve mobility and strength.  Required minimal verbal cues to promote proper body alignment and promote proper body mechanics.  Progressed resistance and repetitions as indicated.   Date:  25 Date:  25 Date:  04-15-25 Date   25   Activity/Exercise       Seated Heel/Toe Raises 20 reps  B LE's 20

## 2025-04-27 NOTE — ASSESSMENT & PLAN NOTE
Lipid panel from 12/9/2024 with borderline triglycerides of 154, LDL of 110  Monitor off medication at this time    Orders:    CBC with Auto Differential; Future    Comprehensive Metabolic Panel; Future    Lipid Panel; Future    TSH; Future

## 2025-04-27 NOTE — ASSESSMENT & PLAN NOTE
A1c 5.7% on 12/9/2024, recheck with upcoming labs  Continue lifestyle changes    Orders:    Hemoglobin A1C; Future

## 2025-04-27 NOTE — ASSESSMENT & PLAN NOTE
KORY from 5/17/2019 as follows:  -EF 55 to 60%  -No left atrial appendage thrombus or PFO  -Mild TR  Status post ablation with EP in May 2019  Nuclear stress test on 10/13/2020 as follows:  -Normal SPECT perfusion and LV systolic function  Recommended restarting low-dose daily aspirin (had temporarily held following recent fall), follow-up per cardiology

## 2025-04-27 NOTE — ASSESSMENT & PLAN NOTE
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  Ambulates with use of an assistive device

## 2025-04-27 NOTE — PROGRESS NOTES
heat    Orders:    XR RADIUS ULNA RIGHT (2 VIEWS); Future      Return for f/u 4-5 months EOV, fasting labs prior .       Subjective   Patient is a 74-year-old  male who presents to the office today for follow-up.  2 to 3 weeks ago patient was rolling out of bed and fell striking his head on a cushioned bench and believing that he hit his right forearm on his nearby walker.  Since then has noticed bruising along the right forearm with a knot underneath the skin slowly improving.  No pain.  Did not lose consciousness and was not down for a prolonged period of time.  Denies headache, neck pain, back pain.  Still uses a cane versus walker with ambulation.  Still has occasional dull right hip pain following a attempted arthrocentesis sometime ago but not severe enough where he needs medication.  Still endorses some abnormal sensation related to neuropathy in both legs/feet but no issues with driving.  Follows with Dr. Fishman of podiatry every 4 months.  Denies chest pain, shortness of breath, palpitations, abdominal pain, melena or hematochezia.        Review of Systems   Respiratory:  Negative for shortness of breath.    Cardiovascular:  Negative for chest pain and palpitations.   Gastrointestinal:  Negative for abdominal pain, anal bleeding and blood in stool.   Musculoskeletal:  Negative for back pain and neck pain.   Skin:         Positive for bruising along right forearm   Neurological:  Negative for syncope and headaches.        Positive for chronic abnormal sensation of both legs and feet          Objective   Physical Exam  Vitals reviewed.   Constitutional:       General: He is not in acute distress.     Appearance: Normal appearance. He is not ill-appearing, toxic-appearing or diaphoretic.   HENT:      Head: Normocephalic and atraumatic.      Comments: No pain or swelling along occiput or along zygomatic arches bilaterally with palpation  No raccoon eyes or East sign bilaterally     Right Ear:

## 2025-04-27 NOTE — ASSESSMENT & PLAN NOTE
Continue losartan      Orders:    CBC with Auto Differential; Future    Comprehensive Metabolic Panel; Future    Lipid Panel; Future    TSH; Future    T4, Free; Future    Urinalysis; Future    losartan (COZAAR) 100 MG tablet; Take 1 tablet by mouth daily

## 2025-04-28 ENCOUNTER — HOSPITAL ENCOUNTER (OUTPATIENT)
Dept: GENERAL RADIOLOGY | Age: 75
Discharge: HOME | End: 2025-05-01
Payer: COMMERCIAL

## 2025-04-28 ENCOUNTER — RESULTS FOLLOW-UP (OUTPATIENT)
Dept: INTERNAL MEDICINE CLINIC | Facility: CLINIC | Age: 75
End: 2025-04-28

## 2025-04-28 ENCOUNTER — OFFICE VISIT (OUTPATIENT)
Dept: INTERNAL MEDICINE CLINIC | Facility: CLINIC | Age: 75
End: 2025-04-28
Payer: COMMERCIAL

## 2025-04-28 VITALS
TEMPERATURE: 97.3 F | HEART RATE: 70 BPM | WEIGHT: 305.6 LBS | OXYGEN SATURATION: 96 % | HEIGHT: 70 IN | RESPIRATION RATE: 17 BRPM | SYSTOLIC BLOOD PRESSURE: 142 MMHG | BODY MASS INDEX: 43.75 KG/M2 | DIASTOLIC BLOOD PRESSURE: 78 MMHG

## 2025-04-28 DIAGNOSIS — M1A.9XX0 CHRONIC GOUT WITHOUT TOPHUS, UNSPECIFIED CAUSE, UNSPECIFIED SITE: ICD-10-CM

## 2025-04-28 DIAGNOSIS — I10 ESSENTIAL HYPERTENSION: ICD-10-CM

## 2025-04-28 DIAGNOSIS — M47.816 LUMBAR FACET ARTHROPATHY: ICD-10-CM

## 2025-04-28 DIAGNOSIS — G62.9 AXONAL POLYNEUROPATHY: Primary | ICD-10-CM

## 2025-04-28 DIAGNOSIS — E03.9 ACQUIRED HYPOTHYROIDISM: ICD-10-CM

## 2025-04-28 DIAGNOSIS — I48.3 TYPICAL ATRIAL FLUTTER (HCC): ICD-10-CM

## 2025-04-28 DIAGNOSIS — E78.2 MIXED HYPERLIPIDEMIA: ICD-10-CM

## 2025-04-28 DIAGNOSIS — S40.021A CONTUSION OF RIGHT UPPER EXTREMITY, INITIAL ENCOUNTER: ICD-10-CM

## 2025-04-28 DIAGNOSIS — R73.03 PREDIABETES: ICD-10-CM

## 2025-04-28 DIAGNOSIS — G47.33 OBSTRUCTIVE SLEEP APNEA: ICD-10-CM

## 2025-04-28 DIAGNOSIS — E55.9 VITAMIN D DEFICIENCY: ICD-10-CM

## 2025-04-28 PROCEDURE — 3077F SYST BP >= 140 MM HG: CPT | Performed by: STUDENT IN AN ORGANIZED HEALTH CARE EDUCATION/TRAINING PROGRAM

## 2025-04-28 PROCEDURE — 1123F ACP DISCUSS/DSCN MKR DOCD: CPT | Performed by: STUDENT IN AN ORGANIZED HEALTH CARE EDUCATION/TRAINING PROGRAM

## 2025-04-28 PROCEDURE — 1159F MED LIST DOCD IN RCRD: CPT | Performed by: STUDENT IN AN ORGANIZED HEALTH CARE EDUCATION/TRAINING PROGRAM

## 2025-04-28 PROCEDURE — 73090 X-RAY EXAM OF FOREARM: CPT

## 2025-04-28 PROCEDURE — 1126F AMNT PAIN NOTED NONE PRSNT: CPT | Performed by: STUDENT IN AN ORGANIZED HEALTH CARE EDUCATION/TRAINING PROGRAM

## 2025-04-28 PROCEDURE — 3078F DIAST BP <80 MM HG: CPT | Performed by: STUDENT IN AN ORGANIZED HEALTH CARE EDUCATION/TRAINING PROGRAM

## 2025-04-28 PROCEDURE — 99214 OFFICE O/P EST MOD 30 MIN: CPT | Performed by: STUDENT IN AN ORGANIZED HEALTH CARE EDUCATION/TRAINING PROGRAM

## 2025-04-28 RX ORDER — ALLOPURINOL 300 MG/1
300 TABLET ORAL DAILY
Qty: 90 TABLET | Refills: 2 | Status: SHIPPED | OUTPATIENT
Start: 2025-04-28

## 2025-04-28 RX ORDER — LEVOTHYROXINE SODIUM 200 UG/1
200 TABLET ORAL
Qty: 90 TABLET | Refills: 2 | Status: SHIPPED | OUTPATIENT
Start: 2025-04-28

## 2025-04-28 RX ORDER — LOSARTAN POTASSIUM 100 MG/1
100 TABLET ORAL DAILY
Qty: 90 TABLET | Refills: 2 | Status: SHIPPED | OUTPATIENT
Start: 2025-04-28

## 2025-04-28 SDOH — ECONOMIC STABILITY: FOOD INSECURITY: WITHIN THE PAST 12 MONTHS, YOU WORRIED THAT YOUR FOOD WOULD RUN OUT BEFORE YOU GOT MONEY TO BUY MORE.: NEVER TRUE

## 2025-04-28 SDOH — ECONOMIC STABILITY: FOOD INSECURITY: WITHIN THE PAST 12 MONTHS, THE FOOD YOU BOUGHT JUST DIDN'T LAST AND YOU DIDN'T HAVE MONEY TO GET MORE.: NEVER TRUE

## 2025-04-28 ASSESSMENT — ENCOUNTER SYMPTOMS
SHORTNESS OF BREATH: 0
BLOOD IN STOOL: 0
ANAL BLEEDING: 0
BACK PAIN: 0
ABDOMINAL PAIN: 0

## 2025-04-28 ASSESSMENT — PATIENT HEALTH QUESTIONNAIRE - PHQ9
SUM OF ALL RESPONSES TO PHQ QUESTIONS 1-9: 0
2. FEELING DOWN, DEPRESSED OR HOPELESS: NOT AT ALL
1. LITTLE INTEREST OR PLEASURE IN DOING THINGS: NOT AT ALL
SUM OF ALL RESPONSES TO PHQ QUESTIONS 1-9: 0

## 2025-05-03 ASSESSMENT — SLEEP AND FATIGUE QUESTIONNAIRES
HOW LIKELY ARE YOU TO NOD OFF OR FALL ASLEEP WHILE LYING DOWN TO REST IN THE AFTERNOON WHEN CIRCUMSTANCES PERMIT: WOULD NEVER DOZE
ESS TOTAL SCORE: 0
HOW LIKELY ARE YOU TO NOD OFF OR FALL ASLEEP WHILE WATCHING TV: WOULD NEVER DOZE
HOW LIKELY ARE YOU TO NOD OFF OR FALL ASLEEP WHEN YOU ARE A PASSENGER IN A CAR FOR AN HOUR WITHOUT A BREAK: WOULD NEVER DOZE
HOW LIKELY ARE YOU TO NOD OFF OR FALL ASLEEP WHILE LYING DOWN TO REST IN THE AFTERNOON WHEN CIRCUMSTANCES PERMIT: WOULD NEVER DOZE
HOW LIKELY ARE YOU TO NOD OFF OR FALL ASLEEP WHILE SITTING INACTIVE IN A PUBLIC PLACE: WOULD NEVER DOZE
HOW LIKELY ARE YOU TO NOD OFF OR FALL ASLEEP IN A CAR, WHILE STOPPED FOR A FEW MINUTES IN TRAFFIC: WOULD NEVER DOZE
HOW LIKELY ARE YOU TO NOD OFF OR FALL ASLEEP WHEN YOU ARE A PASSENGER IN A CAR FOR AN HOUR WITHOUT A BREAK: WOULD NEVER DOZE
HOW LIKELY ARE YOU TO NOD OFF OR FALL ASLEEP WHILE SITTING AND TALKING TO SOMEONE: WOULD NEVER DOZE
HOW LIKELY ARE YOU TO NOD OFF OR FALL ASLEEP WHILE SITTING AND READING: WOULD NEVER DOZE
HOW LIKELY ARE YOU TO NOD OFF OR FALL ASLEEP WHILE SITTING QUIETLY AFTER LUNCH WITHOUT ALCOHOL: WOULD NEVER DOZE
HOW LIKELY ARE YOU TO NOD OFF OR FALL ASLEEP IN A CAR, WHILE STOPPED FOR A FEW MINUTES IN TRAFFIC: WOULD NEVER DOZE
HOW LIKELY ARE YOU TO NOD OFF OR FALL ASLEEP WHILE SITTING INACTIVE IN A PUBLIC PLACE: WOULD NEVER DOZE
HOW LIKELY ARE YOU TO NOD OFF OR FALL ASLEEP WHILE SITTING QUIETLY AFTER LUNCH WITHOUT ALCOHOL: WOULD NEVER DOZE
HOW LIKELY ARE YOU TO NOD OFF OR FALL ASLEEP WHILE SITTING AND READING: WOULD NEVER DOZE
HOW LIKELY ARE YOU TO NOD OFF OR FALL ASLEEP WHILE SITTING AND TALKING TO SOMEONE: WOULD NEVER DOZE
HOW LIKELY ARE YOU TO NOD OFF OR FALL ASLEEP WHILE WATCHING TV: WOULD NEVER DOZE

## 2025-05-06 ENCOUNTER — OFFICE VISIT (OUTPATIENT)
Dept: SLEEP MEDICINE | Age: 75
End: 2025-05-06
Payer: COMMERCIAL

## 2025-05-06 VITALS
BODY MASS INDEX: 43.61 KG/M2 | OXYGEN SATURATION: 97 % | TEMPERATURE: 98.1 F | DIASTOLIC BLOOD PRESSURE: 69 MMHG | RESPIRATION RATE: 20 BRPM | WEIGHT: 304.6 LBS | HEIGHT: 70 IN | HEART RATE: 74 BPM | SYSTOLIC BLOOD PRESSURE: 147 MMHG

## 2025-05-06 DIAGNOSIS — E66.813 CLASS 3 SEVERE OBESITY DUE TO EXCESS CALORIES WITHOUT SERIOUS COMORBIDITY WITH BODY MASS INDEX (BMI) OF 40.0 TO 44.9 IN ADULT (HCC): ICD-10-CM

## 2025-05-06 DIAGNOSIS — R06.83 SNORING: ICD-10-CM

## 2025-05-06 DIAGNOSIS — R06.81 WITNESSED APNEIC SPELLS: ICD-10-CM

## 2025-05-06 DIAGNOSIS — G47.33 OSA (OBSTRUCTIVE SLEEP APNEA): Primary | ICD-10-CM

## 2025-05-06 DIAGNOSIS — G47.34 NOCTURNAL HYPOXEMIA: ICD-10-CM

## 2025-05-06 DIAGNOSIS — G47.8 NON-RESTORATIVE SLEEP: ICD-10-CM

## 2025-05-06 PROCEDURE — 99203 OFFICE O/P NEW LOW 30 MIN: CPT | Performed by: NURSE PRACTITIONER

## 2025-05-06 PROCEDURE — 1126F AMNT PAIN NOTED NONE PRSNT: CPT | Performed by: NURSE PRACTITIONER

## 2025-05-06 PROCEDURE — 1123F ACP DISCUSS/DSCN MKR DOCD: CPT | Performed by: NURSE PRACTITIONER

## 2025-05-06 PROCEDURE — 1160F RVW MEDS BY RX/DR IN RCRD: CPT | Performed by: NURSE PRACTITIONER

## 2025-05-06 PROCEDURE — 1159F MED LIST DOCD IN RCRD: CPT | Performed by: NURSE PRACTITIONER

## 2025-05-06 PROCEDURE — 3077F SYST BP >= 140 MM HG: CPT | Performed by: NURSE PRACTITIONER

## 2025-05-06 PROCEDURE — 3078F DIAST BP <80 MM HG: CPT | Performed by: NURSE PRACTITIONER

## 2025-05-06 ASSESSMENT — SLEEP AND FATIGUE QUESTIONNAIRES
HOW LIKELY ARE YOU TO NOD OFF OR FALL ASLEEP WHILE SITTING INACTIVE IN A PUBLIC PLACE: WOULD NEVER DOZE
HOW LIKELY ARE YOU TO NOD OFF OR FALL ASLEEP WHILE SITTING AND TALKING TO SOMEONE: WOULD NEVER DOZE
HOW LIKELY ARE YOU TO NOD OFF OR FALL ASLEEP WHEN YOU ARE A PASSENGER IN A CAR FOR AN HOUR WITHOUT A BREAK: WOULD NEVER DOZE
ESS TOTAL SCORE: 3
HOW LIKELY ARE YOU TO NOD OFF OR FALL ASLEEP IN A CAR, WHILE STOPPED FOR A FEW MINUTES IN TRAFFIC: SLIGHT CHANCE OF DOZING
HOW LIKELY ARE YOU TO NOD OFF OR FALL ASLEEP WHILE SITTING QUIETLY AFTER LUNCH WITHOUT ALCOHOL: SLIGHT CHANCE OF DOZING
HOW LIKELY ARE YOU TO NOD OFF OR FALL ASLEEP WHILE SITTING AND READING: WOULD NEVER DOZE
HOW LIKELY ARE YOU TO NOD OFF OR FALL ASLEEP WHILE LYING DOWN TO REST IN THE AFTERNOON WHEN CIRCUMSTANCES PERMIT: WOULD NEVER DOZE
HOW LIKELY ARE YOU TO NOD OFF OR FALL ASLEEP WHILE WATCHING TV: SLIGHT CHANCE OF DOZING

## 2025-05-06 NOTE — PROGRESS NOTES
[facility]  [address]  [place] [address]  [phone]    Patient Name:  Hugo Bailey  YOB: 1950      Office Visit 5/6/2025    CHIEF COMPLAINT:    Chief Complaint   Patient presents with    Sleep Apnea    New Patient       HISTORY OF PRESENT ILLNESS:      The patient presents in outpatient consultation at the request of Dr. Hayes for management of obstructive sleep apnea.  Significant PMH of hypertension, prediabetes, KISHORE, hypothyroidism, and obesity.     The diagnostic polysomnography on 05/12/2019 was notable for an apnea hypopnea index of 35.3 including 17 obstructive apneas, 3 mixed apneas, 12 central apneas, and 34 hypopneas.  Oxygen desaturations are low as 64% were noted with SpO2 less than 89% for a total of 20.2 minutes of the test.  Significant cardiac arrhythmias were not evident.  The patient was noted to have 14.7 limb movements with a limb movement arousal index being about 5.4.  A subsequent CPAP titration study was conducted.  CPAP levels as high as 9 cm were performed.  CPAP was significantly effective in eliminating disordered breathing. CPAP was tolerated well by the patient.  The patient reports feeling about the same the day following.    He reports that after he was diagnosed with KISHORE he did wear CPAP for approximately 3 years.  States that he was traveling a lot at the time and maintaining the CPAP properly was difficult so he just stopped using it.  He recently had an arrhythmia and reports that he did have an ablation and his cardiologist was concerned about his uncontrolled sleep apnea.  He reports that he tolerated CPAP okay but is interested in other treatment options that are now available.  We did discuss an oral appliance and inspire therapy as possible options.  For inspire he is aware that he will need to lose weight to be eligible through insurance.  We did discuss Zepbound as a possible option to help with weight loss

## 2025-05-06 NOTE — PATIENT INSTRUCTIONS
Split-night sleep study ordered  Will either start back on CPAP therapy or refer to dentistry based on sleep study results  Recommendations as above  Follow-up will be arranged after sleep study completed and treatment plan determined or sooner if needed

## 2025-06-23 ENCOUNTER — HOSPITAL ENCOUNTER (OUTPATIENT)
Dept: SLEEP MEDICINE | Age: 75
Discharge: HOME | End: 2025-06-26
Payer: COMMERCIAL

## 2025-06-23 PROCEDURE — 95811 POLYSOM 6/>YRS CPAP 4/> PARM: CPT

## 2025-07-03 ENCOUNTER — RESULTS FOLLOW-UP (OUTPATIENT)
Dept: SLEEP MEDICINE | Age: 75
End: 2025-07-03

## 2025-07-03 DIAGNOSIS — G47.33 OSA (OBSTRUCTIVE SLEEP APNEA): Primary | ICD-10-CM

## 2025-07-11 ENCOUNTER — PATIENT MESSAGE (OUTPATIENT)
Dept: UROLOGY | Age: 75
End: 2025-07-11

## 2025-07-11 NOTE — TELEPHONE ENCOUNTER
Spoke with Pt OTP and informed him that he can keep his previously scheduled follow up appointment in [place] with Dr Charles, and all he needs to do is call Radiology to schedule his LISA prior to his OV. Pt VU and thanked me for calling him to follow up.

## 2025-07-15 ENCOUNTER — TELEPHONE (OUTPATIENT)
Dept: SLEEP MEDICINE | Age: 75
End: 2025-07-15

## 2025-07-18 NOTE — TELEPHONE ENCOUNTER
Patient has agreed to start CPAP. He is suppose to be setup by [facility] but his insurance states they will ship him a cpap and do virtual setup. Patient does not like that and is trying to work it out with his insurance company so he can be setup at a local DME.

## 2025-07-21 ENCOUNTER — OFFICE VISIT (OUTPATIENT)
Age: 75
End: 2025-07-21
Payer: COMMERCIAL

## 2025-07-21 VITALS
WEIGHT: 288 LBS | DIASTOLIC BLOOD PRESSURE: 72 MMHG | BODY MASS INDEX: 41.23 KG/M2 | HEART RATE: 72 BPM | HEIGHT: 70 IN | SYSTOLIC BLOOD PRESSURE: 148 MMHG

## 2025-07-21 DIAGNOSIS — E78.2 MIXED HYPERLIPIDEMIA: ICD-10-CM

## 2025-07-21 DIAGNOSIS — I10 ESSENTIAL HYPERTENSION: ICD-10-CM

## 2025-07-21 DIAGNOSIS — I48.3 TYPICAL ATRIAL FLUTTER (HCC): Primary | ICD-10-CM

## 2025-07-21 DIAGNOSIS — R06.02 SHORTNESS OF BREATH: ICD-10-CM

## 2025-07-21 DIAGNOSIS — G47.33 OSA (OBSTRUCTIVE SLEEP APNEA): ICD-10-CM

## 2025-07-21 PROCEDURE — 1123F ACP DISCUSS/DSCN MKR DOCD: CPT | Performed by: INTERNAL MEDICINE

## 2025-07-21 PROCEDURE — 99214 OFFICE O/P EST MOD 30 MIN: CPT | Performed by: INTERNAL MEDICINE

## 2025-07-21 PROCEDURE — 3078F DIAST BP <80 MM HG: CPT | Performed by: INTERNAL MEDICINE

## 2025-07-21 PROCEDURE — 93000 ELECTROCARDIOGRAM COMPLETE: CPT | Performed by: INTERNAL MEDICINE

## 2025-07-21 PROCEDURE — 3077F SYST BP >= 140 MM HG: CPT | Performed by: INTERNAL MEDICINE

## 2025-07-21 PROCEDURE — 1126F AMNT PAIN NOTED NONE PRSNT: CPT | Performed by: INTERNAL MEDICINE

## 2025-07-21 RX ORDER — AMLODIPINE BESYLATE 5 MG/1
5 TABLET ORAL DAILY
Qty: 90 TABLET | Refills: 3 | Status: SHIPPED | OUTPATIENT
Start: 2025-07-21

## 2025-07-21 NOTE — PROGRESS NOTES
[address] [facility], [place] [address]  PHONE: [phone]     25    NAME:  Hugo Bailey  : 1950  MRN: [id number]       SUBJECTIVE:   Hugo Bailey is a 75 y.o. male seen for a follow up visit regarding the following:     Chief Complaint   Patient presents with    Typical atrial flutter        HPI:   19 A flutter ablation with Dr. Alva.     KORY 2019: normal EF.    KISHORE - not on CPAP now.    NST 2019 and 10/2020: normal, low risk     Had another sleep study, working on CPAP.  Lost 17 pds, better diet.  Eating less now.    No CP.   No new angina. No new BE, SOB.    No new palp now. On ASA now.    Never on statin.    Patient denies recent history of orthopnea, PND, excessive dizziness and/or syncope.         Past Medical History, Past Surgical History, Family history, Social History, and Medications were all reviewed with the patient today and updated as necessary.     Current Outpatient Medications   Medication Sig Dispense Refill    Cyanocobalamin (B-12 PO) Take 1 tablet by mouth daily      amLODIPine (NORVASC) 5 MG tablet Take 1 tablet by mouth daily 90 tablet 3    allopurinol (ZYLOPRIM) 300 MG tablet Take 1 tablet by mouth daily 90 tablet 2    levothyroxine (SYNTHROID) 200 MCG tablet Take 1 tablet by mouth every morning (before breakfast) 90 tablet 2    losartan (COZAAR) 100 MG tablet Take 1 tablet by mouth daily 90 tablet 2    vitamin D3 (CHOLECALCIFEROL) 25 MCG (1000 UT) TABS tablet Take 2 tablets by mouth daily      aspirin 81 MG EC tablet Take by mouth daily       No current facility-administered medications for this visit.        No Known Allergies  Patient Active Problem List    Diagnosis Date Noted    Myopathy 2022     Priority: Medium    Axonal polyneuropathy 2022     Priority: Medium    Leg weakness, bilateral 2022     Priority: Medium    Numbness and tingling of both feet 2022     Priority: Medium    Class 3 severe obesity due to

## 2025-07-31 ENCOUNTER — TELEPHONE (OUTPATIENT)
Age: 75
End: 2025-07-31

## 2025-08-04 ENCOUNTER — TELEPHONE (OUTPATIENT)
Dept: INTERNAL MEDICINE CLINIC | Facility: CLINIC | Age: 75
End: 2025-08-04